# Patient Record
Sex: FEMALE | Race: BLACK OR AFRICAN AMERICAN | Employment: UNEMPLOYED
[De-identification: names, ages, dates, MRNs, and addresses within clinical notes are randomized per-mention and may not be internally consistent; named-entity substitution may affect disease eponyms.]

---

## 2017-02-28 ENCOUNTER — NURSE ONLY (OUTPATIENT)
Dept: OBGYN | Facility: CLINIC | Age: 27
End: 2017-02-28

## 2017-02-28 VITALS — RESPIRATION RATE: 14 BRPM | HEIGHT: 62 IN | WEIGHT: 184 LBS | BODY MASS INDEX: 33.86 KG/M2

## 2017-02-28 DIAGNOSIS — Z23 IMMUNIZATION DUE: Primary | ICD-10-CM

## 2017-02-28 PROCEDURE — 90471 IMMUNIZATION ADMIN: CPT | Performed by: OBSTETRICS & GYNECOLOGY

## 2017-02-28 PROCEDURE — 90651 9VHPV VACCINE 2/3 DOSE IM: CPT | Performed by: OBSTETRICS & GYNECOLOGY

## 2017-04-13 DIAGNOSIS — N94.6 DYSMENORRHEA: ICD-10-CM

## 2017-04-13 RX ORDER — IBUPROFEN 800 MG/1
800 TABLET ORAL EVERY 8 HOURS PRN
Qty: 60 TABLET | Refills: 0 | Status: SHIPPED | OUTPATIENT
Start: 2017-04-13 | End: 2017-05-05 | Stop reason: SDUPTHER

## 2017-04-18 ENCOUNTER — OFFICE VISIT (OUTPATIENT)
Dept: INTERNAL MEDICINE CLINIC | Age: 27
End: 2017-04-18
Payer: COMMERCIAL

## 2017-04-18 VITALS
HEIGHT: 62 IN | SYSTOLIC BLOOD PRESSURE: 110 MMHG | DIASTOLIC BLOOD PRESSURE: 68 MMHG | BODY MASS INDEX: 39.38 KG/M2 | WEIGHT: 214 LBS

## 2017-04-18 DIAGNOSIS — F32.89 OTHER DEPRESSION: Primary | ICD-10-CM

## 2017-04-18 DIAGNOSIS — F31.9 BIPOLAR 1 DISORDER (HCC): ICD-10-CM

## 2017-04-18 DIAGNOSIS — Z00.00 HEALTH CARE MAINTENANCE: ICD-10-CM

## 2017-04-18 PROCEDURE — 99213 OFFICE O/P EST LOW 20 MIN: CPT | Performed by: INTERNAL MEDICINE

## 2017-04-18 RX ORDER — CITALOPRAM 20 MG/1
20 TABLET ORAL DAILY
Qty: 30 TABLET | Refills: 6 | Status: SHIPPED | OUTPATIENT
Start: 2017-04-18 | End: 2021-03-09 | Stop reason: SDUPTHER

## 2017-05-05 DIAGNOSIS — N94.6 DYSMENORRHEA: ICD-10-CM

## 2017-05-05 RX ORDER — IBUPROFEN 800 MG/1
TABLET ORAL
Qty: 60 TABLET | Refills: 1 | Status: SHIPPED | OUTPATIENT
Start: 2017-05-05 | End: 2017-08-01 | Stop reason: SDUPTHER

## 2017-08-01 DIAGNOSIS — N94.6 DYSMENORRHEA: ICD-10-CM

## 2017-08-01 RX ORDER — IBUPROFEN 800 MG/1
800 TABLET ORAL EVERY 8 HOURS PRN
Qty: 60 TABLET | Refills: 1 | Status: SHIPPED | OUTPATIENT
Start: 2017-08-01 | End: 2017-09-26 | Stop reason: SDUPTHER

## 2017-09-26 DIAGNOSIS — N94.6 DYSMENORRHEA: ICD-10-CM

## 2017-09-27 RX ORDER — IBUPROFEN 800 MG/1
TABLET ORAL
Qty: 60 TABLET | Refills: 1 | Status: SHIPPED | OUTPATIENT
Start: 2017-09-27 | End: 2018-01-04 | Stop reason: SDUPTHER

## 2018-01-04 DIAGNOSIS — N94.6 DYSMENORRHEA: ICD-10-CM

## 2018-01-04 RX ORDER — IBUPROFEN 800 MG/1
TABLET ORAL
Qty: 60 TABLET | Refills: 3 | Status: SHIPPED | OUTPATIENT
Start: 2018-01-04 | End: 2018-05-21 | Stop reason: SDUPTHER

## 2018-05-21 DIAGNOSIS — N94.6 DYSMENORRHEA: ICD-10-CM

## 2018-05-23 RX ORDER — IBUPROFEN 800 MG/1
TABLET ORAL
Qty: 60 TABLET | Refills: 3 | Status: SHIPPED | OUTPATIENT
Start: 2018-05-23 | End: 2020-04-30 | Stop reason: SDUPTHER

## 2018-09-18 DIAGNOSIS — N94.6 DYSMENORRHEA: ICD-10-CM

## 2018-09-20 DIAGNOSIS — N94.6 DYSMENORRHEA: ICD-10-CM

## 2018-09-20 RX ORDER — IBUPROFEN 800 MG/1
800 TABLET ORAL EVERY 8 HOURS PRN
Qty: 60 TABLET | Refills: 3 | OUTPATIENT
Start: 2018-09-20

## 2018-09-20 NOTE — TELEPHONE ENCOUNTER
Medication: Ibuprofen  Last visit: 4/18/2017  Next visit: Visit date not found  Last refill: 5/23/2018  Pharmacy: [facility]

## 2018-09-24 ENCOUNTER — TELEPHONE (OUTPATIENT)
Dept: INTERNAL MEDICINE CLINIC | Age: 28
End: 2018-09-24

## 2018-09-24 DIAGNOSIS — N94.6 DYSMENORRHEA: ICD-10-CM

## 2018-09-24 RX ORDER — IBUPROFEN 800 MG/1
TABLET ORAL
Qty: 60 TABLET | Refills: 3 | OUTPATIENT
Start: 2018-09-24

## 2018-09-25 NOTE — TELEPHONE ENCOUNTER
Cannot give Ibuprofen that high dose for long term   Refer to Dr Codie Red to address her Menstrual cramps

## 2018-09-26 RX ORDER — IBUPROFEN 800 MG/1
800 TABLET ORAL EVERY 8 HOURS PRN
Qty: 21 TABLET | Refills: 0 | Status: SHIPPED | OUTPATIENT
Start: 2018-09-26 | End: 2021-03-09

## 2018-10-18 ENCOUNTER — HOSPITAL ENCOUNTER (OUTPATIENT)
Age: 28
Setting detail: SPECIMEN
Discharge: HOME | End: 2018-10-18
Payer: COMMERCIAL

## 2018-10-18 ENCOUNTER — OFFICE VISIT (OUTPATIENT)
Dept: OBGYN | Age: 28
End: 2018-10-18
Payer: COMMERCIAL

## 2018-10-18 VITALS
SYSTOLIC BLOOD PRESSURE: 136 MMHG | HEIGHT: 62 IN | WEIGHT: 276 LBS | DIASTOLIC BLOOD PRESSURE: 89 MMHG | BODY MASS INDEX: 50.79 KG/M2 | HEART RATE: 88 BPM

## 2018-10-18 DIAGNOSIS — Z11.3 SCREEN FOR STD (SEXUALLY TRANSMITTED DISEASE): ICD-10-CM

## 2018-10-18 DIAGNOSIS — Z00.00 PREVENTATIVE HEALTH CARE: ICD-10-CM

## 2018-10-18 DIAGNOSIS — Z01.419 WELL WOMAN EXAM WITH ROUTINE GYNECOLOGICAL EXAM: Primary | ICD-10-CM

## 2018-10-18 PROCEDURE — 99213 OFFICE O/P EST LOW 20 MIN: CPT | Performed by: OBSTETRICS & GYNECOLOGY

## 2018-10-18 PROCEDURE — 99395 PREV VISIT EST AGE 18-39: CPT | Performed by: OBSTETRICS & GYNECOLOGY

## 2018-10-18 PROCEDURE — G8484 FLU IMMUNIZE NO ADMIN: HCPCS | Performed by: OBSTETRICS & GYNECOLOGY

## 2018-10-18 RX ORDER — RISPERIDONE 1 MG/1
1 TABLET, FILM COATED ORAL DAILY
Refills: 0 | COMMUNITY
Start: 2018-08-23

## 2018-10-18 NOTE — PROGRESS NOTES
History: yes - according to pt - not found in our records on Epic  Colposcopy History: yes - according to pt  Date of Last Mammogram: n/a  Date of Last Colonoscopy: n/a  Date of Last Bone Density: n/a      ________________________________________________________________________        REVIEW OF SYSTEMS:       A minimum of an eleven point review of systems was completed. Review Of Systems (11 point):  Constitutional: No fever, chills or malaise; No weight change or fatigue  Head and Eyes: No vision, Headache, Dizziness or trauma in last 12 months  ENT ROS: No hearing, Tinnitis, sinus or taste problems  Hematological and Lymphatic ROS:No Lymphoma, Von Willebrand's, Hemophillia or Bleeding History  Psych ROS: No Depression, Homicidal thoughts,suicidal thoughts, or anxiety  Breast ROS: No prior breast abnormalities or lumps  Respiratory ROS: No SOB, Pneumoniae,Cough, or Pulmonary Embolism History  Cardiovascular ROS: No Chest Pain with Exertion, Palpitations, Syncope, Edema, Arrhythmia  Gastrointestinal ROS: No Indigestion, Heartburn, Nausea, vomiting, Diarrhea, Constipation,or Bowel Changes; No Bloody Stools or melena  Genito-Urinary ROS: No Dysuria, Hematuria or Nocturia. No Urinary Incontinence or Vaginal Discharge  Musculoskeletal ROS: No Arthralgia, Arthritis,Gout,Osteoporosis or Rheumatism  Neurological ROS: No CVA, Migraines, Epilepsy, Seizure Hx, or Limb Weakness  Dermatological ROS: No Rash, Itching, Hives, Mole Changes or Cancer                                                                                                                                                                                                                                  PHYSICAL Exam:     Constitutional:  Vitals:    10/18/18 1536   BP: 136/89   Pulse: 88   Weight: 276 lb (125.2 kg)   Height: 5' 2\" (1.575 m)         General Appearance: This  is a well Developed, well Nourished, well groomed female. Her BMI was reviewed.

## 2018-10-19 LAB
C TRACH DNA GENITAL QL NAA+PROBE: NEGATIVE
DIRECT EXAM: ABNORMAL
Lab: ABNORMAL
N. GONORRHOEAE DNA: NEGATIVE
SPECIMEN DESCRIPTION: ABNORMAL
STATUS: ABNORMAL

## 2018-10-22 RX ORDER — METRONIDAZOLE 500 MG/1
500 TABLET ORAL 2 TIMES DAILY
Qty: 14 TABLET | Refills: 0 | Status: SHIPPED | OUTPATIENT
Start: 2018-10-22 | End: 2018-10-29

## 2018-10-22 RX ORDER — FLUCONAZOLE 150 MG/1
150 TABLET ORAL ONCE
Qty: 2 TABLET | Refills: 0 | Status: SHIPPED | OUTPATIENT
Start: 2018-10-22 | End: 2018-10-22

## 2018-11-05 LAB — CYTOLOGY REPORT: NORMAL

## 2020-04-29 ENCOUNTER — TELEPHONE (OUTPATIENT)
Dept: ADMINISTRATIVE | Age: 30
End: 2020-04-29

## 2020-04-30 RX ORDER — IBUPROFEN 800 MG/1
TABLET ORAL
Qty: 60 TABLET | Refills: 3 | Status: SHIPPED | OUTPATIENT
Start: 2020-04-30

## 2020-04-30 NOTE — TELEPHONE ENCOUNTER
Pt is calling stating she needs a refill on her Motrin.  Please call pt with any questions at phone number [phone]

## 2020-07-29 ENCOUNTER — TELEPHONE (OUTPATIENT)
Dept: OBGYN | Age: 30
End: 2020-07-29

## 2020-07-31 ENCOUNTER — TELEPHONE (OUTPATIENT)
Dept: ADMINISTRATIVE | Age: 30
End: 2020-07-31

## 2020-07-31 NOTE — TELEPHONE ENCOUNTER
Pt is calling wanting to get an update on her medication.  Please call pt at phone number [phone]

## 2020-08-12 ENCOUNTER — TELEPHONE (OUTPATIENT)
Dept: INTERNAL MEDICINE CLINIC | Age: 30
End: 2020-08-12

## 2020-11-10 ENCOUNTER — TELEPHONE (OUTPATIENT)
Dept: OBGYN | Age: 30
End: 2020-11-10

## 2020-11-10 NOTE — TELEPHONE ENCOUNTER
Called patient and left voice message for patient to give office a call to reschedule no show appointment 11/10/20.

## 2021-02-24 ENCOUNTER — HOSPITAL ENCOUNTER (OUTPATIENT)
Age: 31
Setting detail: SPECIMEN
Discharge: HOME | End: 2021-02-24
Payer: COMMERCIAL

## 2021-02-24 ENCOUNTER — OFFICE VISIT (OUTPATIENT)
Dept: OBGYN | Age: 31
End: 2021-02-24
Payer: COMMERCIAL

## 2021-02-24 VITALS
DIASTOLIC BLOOD PRESSURE: 86 MMHG | SYSTOLIC BLOOD PRESSURE: 137 MMHG | BODY MASS INDEX: 55.16 KG/M2 | TEMPERATURE: 97 F | HEART RATE: 78 BPM | WEIGHT: 293 LBS

## 2021-02-24 DIAGNOSIS — Z01.419 WOMEN'S ANNUAL ROUTINE GYNECOLOGICAL EXAMINATION: Primary | ICD-10-CM

## 2021-02-24 PROCEDURE — 90686 IIV4 VACC NO PRSV 0.5 ML IM: CPT | Performed by: OBSTETRICS & GYNECOLOGY

## 2021-02-24 PROCEDURE — 4004F PT TOBACCO SCREEN RCVD TLK: CPT | Performed by: STUDENT IN AN ORGANIZED HEALTH CARE EDUCATION/TRAINING PROGRAM

## 2021-02-24 PROCEDURE — G8482 FLU IMMUNIZE ORDER/ADMIN: HCPCS | Performed by: STUDENT IN AN ORGANIZED HEALTH CARE EDUCATION/TRAINING PROGRAM

## 2021-02-24 PROCEDURE — G8417 CALC BMI ABV UP PARAM F/U: HCPCS | Performed by: STUDENT IN AN ORGANIZED HEALTH CARE EDUCATION/TRAINING PROGRAM

## 2021-02-24 PROCEDURE — G8427 DOCREV CUR MEDS BY ELIG CLIN: HCPCS | Performed by: STUDENT IN AN ORGANIZED HEALTH CARE EDUCATION/TRAINING PROGRAM

## 2021-02-24 PROCEDURE — 99395 PREV VISIT EST AGE 18-39: CPT | Performed by: STUDENT IN AN ORGANIZED HEALTH CARE EDUCATION/TRAINING PROGRAM

## 2021-02-24 NOTE — PROGRESS NOTES
Vaccine Information Sheet, \"Influenza - Inactivated\"  given to Cheryl Villalba, or parent/legal guardian of  Cheryl Villalba and verbalized understanding. Patient responses:    Have you ever had a reaction to a flu vaccine? No  Are you able to eat eggs without adverse effects? Yes  Do you have any current illness? No  Have you ever had Guillian Emerson Syndrome? No    Flu vaccine given per order. Please see immunization tab.

## 2021-02-24 NOTE — PROGRESS NOTES
[facility] OB/GYN Annual Visit    Elia Boland  2021                       Primary Care Physician: Bridget Nascimento MD    CC:   Chief Complaint   Patient presents with    Annual Exam         HPI: Elia Boland is a 31 y.o. female     The patient was seen and examined. She is here for an annual visit. She has no complaints today. Her LMP was 2 months ago but this is normal for her. She denies irregular/heavy bleeding and dysmenorrhea. Her periods are regular and last 5-7 days. She describes them as moderate. Her bowel habits are regular. She denies any bloating. She denies dysuria. She denies urinary leaking. She denies abnormal vaginal discharge. She is not sexually active currently but has a history of 1 lifetime male partner. She uses condoms for contraception and is not desiring pregnancy.     Depression Screen: Symptoms of decreased mood absent  Symptoms of anhedonia absent      REVIEW OF SYSTEMS:   An 11 point review of systems was completed    Constitutional: negative fever, negative chills  HEENT: negative visual disturbances, negative headaches  Respiratory: negative dyspnea, negative cough  Cardiovascular: negative chest pain,  negative palpitations  Gastrointestinal: negative abdominal pain, negative RUQ pain, negative N/V, negative diarrhea, negative constipation  Genitourinary: negative dysuria, negative vaginal discharge  Dermatological: negative rash  Hematologic: negative bruising  Immunologic/Lymphatic: negative recent illness, negative recent sick contact  Musculoskeletal: negative back pain, negative myalgias, negative arthralgias  Neurological:  negative dizziness, negative weakness  Behavior/Psych: negative depression, negative anxiety  ________________________________________________________________________    GYNECOLOGICAL HISTORY:  Age of Menarche: 8  Age of Menopause: N/A   Menstrual cycles occur every 4-8 weeks, last 5-7 days, and are moderate Sexually Active: has sex with males, 1 lifetime partner  STD History: positive trichomonas 16    Pap History:  16: NILM  10/18/18: NILM  Colposcopy History: denies    Permanent Sterilization: no  Reversible Birth Control: no  Hormone Replacement Exposure: no    OBSTETRICAL HISTORY:  OB History    Para Term  AB Living   1 1 1 0 0 1   SAB TAB Ectopic Molar Multiple Live Births   0 0 0 0 0 1      # Outcome Date GA Lbr Aram/2nd Weight Sex Delivery Anes PTL Lv   1 Term 2010 [id number]   M CS-LTranv  N GABRIELLA      Birth Comments: failure to descend, ascynclitic       PAST MEDICAL HISTORY:   has a past medical history of Bipolar disorder ([facility] [address] Depression, and Obesity. PAST SURGICAL HISTORY:   has a past surgical history that includes Tonsillectomy and  section. ALLERGIES:  has No Known Allergies. MEDICATIONS:  Prior to Admission medications    Medication Sig Start Date End Date Taking? Authorizing Provider   ondansetron (ZOFRAN-ODT) 4 MG disintegrating tablet Take 4 mg by mouth 18  Yes Historical Provider, MD   ibuprofen (ADVIL;MOTRIN) 800 MG tablet take 1 tablet by mouth every 8 hours if needed for pain 20  Yes Piper Gómez MD   risperiDONE (RISPERDAL) 1 MG tablet Take 1 tablet by mouth daily 18  Yes Historical Provider, MD   ibuprofen (IBU) 800 MG tablet Take 1 tablet by mouth every 8 hours as needed for Pain Take with food 18  Yes Piper Gómez MD   citalopram (CELEXA) 20 MG tablet Take 1 tablet by mouth daily 17  Yes Omari Wynne MD       FAMILY HISTORY:  Family History of Breast, Ovarian, Colon or Uterine Cancer: No   family history includes Cancer in her maternal grandmother. (unknown type)    SOCIAL HISTORY:   reports that she has been smoking cigarettes. She has a 0.10 pack-year smoking history. She has never used smokeless tobacco. She reports that she does not drink alcohol or use drugs.     HEALTH MAINTENANCE: Immunization status: up to date and documented  Gardasil immunization: done    Date of Last Mammogram: patient has never had a mammogram  Date of Last Colonoscopy: patient has never had a colonoscopy  Date of Last Bone Density: patient has never had a bone density scan     VITALS:  Vitals:    02/24/21 1529   BP: 137/86   Site: Right Upper Arm   Position: Sitting   Cuff Size: Large Adult   Pulse: 78   Temp: 97 °F (36.1 °C)   Weight: (!) 301 lb 9.6 oz (136.8 kg)                                                                                                                                                                           PHYSICAL EXAM:   General Appearance: Appears healthy. Alert; in no acute distress. Pleasant. Skin: Skin color, texture, turgor normal. No rashes or lesions. Lymphatic: No abnormally enlarged lymph nodes. HEENT: normocephalic and atraumatic, Thyroid normal to inspection  Respiratory: Normal expansion. Clear to auscultation. No rales, rhonchi, or wheezing.   Cardiovascular: normal rate, regular rhythm, normal S1 and S2, no murmurs, no gallops and intact distal pulses  Breast:  (Chest): normal appearance, no masses or tenderness, No nipple retraction or dimpling, No nipple discharge or bleeding, No axillary or supraclavicular adenopathy, positive acanthosis nigricans in the inferior breast fold bilaterally  Abdomen: obese, soft, non-tender and non-distended, bowel sounds present  Pelvic Exam:   Chaperone for Intimate Exam: Chaperone was present for entire exam, Chaperone Name: Valarie Schwarz RN  External genitalia: General appearance; normal, Hair distribution; normal, Lesions absent  Urinary system: urethral meatus normal  Vaginal: normal mucosa, no lesions, minimal white discharge  Cervix: normal appearing cervix without discharge or lesions  Adnexa: normal adnexa in size, nontender and no masses  Uterus: normal single, nontender  Rectal Exam: exam declined by patient Musculoskeletal: no gross abnormalities  Extremities: non-tender BLE and non-edematous  Psych:  oriented to time, place and person, mood and affect are within normal limits         ASSESSMENT & PLAN:    Imelda Awad is a 31 y.o. female  here for her Well Woman Exam    - Last pap smear  NILM, pap smear collected today   - GC/C, vaginitis collected   - She previously received the Gardasil immunization   - Denies hx of dysmenorrhea or abnormal bleeding    - Not currently sexually active   - Influenza vaccination ordered   - RTC in 1 year for annual exam    Patient Active Problem List    Diagnosis Date Noted    Obesity 2011     Priority: High    H/O trichomonal vaginitis 2015     Treated in 2014      H/O gonorrhea 2015     Treated in 2013, neg JOSE G to follow      Depression 07/15/2013     Patient denies, no longer taking abilify and celexa (stopped in )      Bipolar 1 disorder ([facility] [address] 07/15/2013     Patient denies, no longer taking abilify and celexa (stopped in )         Return in about 1 year (around 2022) for Annual Exam.  No Patient Care Coordination Note on file. Counseling Completed:    discussed need for repeat pap as per American Society for Colposcopy and Cervical Pathology guidelines. discussed birth control and barrier recommendations. discussed STD counseling and prevention. Hereditary Breast, Ovarian, Colon and Uterine Cancer screening discussed. Tobacco & Secondary smoke risks discussed; with recommendation for cessation and avoidance. Routine health maintenance per patients PCP discussed. Patient was seen with total face to face time of 20 minutes. More than 50% of this visit was on counseling and education regarding the problems listed below and her options. She was also counseled on her preventative health maintenance recommendations and follow-up.      Diagnosis Orders

## 2021-02-25 DIAGNOSIS — Z01.419 WOMEN'S ANNUAL ROUTINE GYNECOLOGICAL EXAMINATION: ICD-10-CM

## 2021-02-25 LAB
DIRECT EXAM: ABNORMAL
Lab: ABNORMAL
SPECIMEN DESCRIPTION: ABNORMAL

## 2021-02-26 LAB
C TRACH DNA GENITAL QL NAA+PROBE: NEGATIVE
N. GONORRHOEAE DNA: NEGATIVE
SPECIMEN DESCRIPTION: NORMAL

## 2021-03-01 ENCOUNTER — TELEPHONE (OUTPATIENT)
Dept: OBGYN | Age: 31
End: 2021-03-01

## 2021-03-01 DIAGNOSIS — A59.9 TRICHOMONAS VAGINALIS INFECTION: Primary | ICD-10-CM

## 2021-03-01 RX ORDER — METRONIDAZOLE 500 MG/1
500 TABLET ORAL 2 TIMES DAILY
Qty: 14 TABLET | Refills: 0 | Status: SHIPPED | OUTPATIENT
Start: 2021-03-01 | End: 2021-03-09

## 2021-03-01 NOTE — TELEPHONE ENCOUNTER
Called patient to informed her of positive Trichomonas vaginalis and BV on vaginitis. Will send Rx Flagyl 500mg BID x7 days to [facility] on [address]. Instructed patient to inform all partners so that they can be treated and instructed her to abstain from sexual activity until she is fully treated. All questions answered.        Wilmer Alfred DO   [facility]   [facility], [address] [place] [address] [address] [phone]

## 2021-03-05 LAB — CYTOLOGY REPORT: NORMAL

## 2021-03-08 RX ORDER — PALIPERIDONE PALMITATE 117 MG/.75ML
INJECTION INTRAMUSCULAR
COMMUNITY
Start: 2021-03-03

## 2021-03-09 ENCOUNTER — OFFICE VISIT (OUTPATIENT)
Dept: INTERNAL MEDICINE CLINIC | Age: 31
End: 2021-03-09
Payer: COMMERCIAL

## 2021-03-09 VITALS
TEMPERATURE: 98.4 F | HEIGHT: 62 IN | BODY MASS INDEX: 53.92 KG/M2 | SYSTOLIC BLOOD PRESSURE: 100 MMHG | WEIGHT: 293 LBS | DIASTOLIC BLOOD PRESSURE: 72 MMHG

## 2021-03-09 DIAGNOSIS — Z23 NEED FOR 23-POLYVALENT PNEUMOCOCCAL POLYSACCHARIDE VACCINE: ICD-10-CM

## 2021-03-09 DIAGNOSIS — E11.9 TYPE 2 DIABETES MELLITUS WITHOUT COMPLICATION, WITHOUT LONG-TERM CURRENT USE OF INSULIN (HCC): ICD-10-CM

## 2021-03-09 DIAGNOSIS — E66.01 MORBID OBESITY WITH BODY MASS INDEX (BMI) OF 50.0 TO 59.9 IN ADULT (HCC): ICD-10-CM

## 2021-03-09 DIAGNOSIS — F31.9 BIPOLAR I DISORDER (HCC): ICD-10-CM

## 2021-03-09 DIAGNOSIS — Z76.89 ENCOUNTER TO ESTABLISH CARE: Primary | ICD-10-CM

## 2021-03-09 DIAGNOSIS — F32.89 OTHER DEPRESSION: ICD-10-CM

## 2021-03-09 PROCEDURE — 3046F HEMOGLOBIN A1C LEVEL >9.0%: CPT | Performed by: NURSE PRACTITIONER

## 2021-03-09 PROCEDURE — 99203 OFFICE O/P NEW LOW 30 MIN: CPT | Performed by: NURSE PRACTITIONER

## 2021-03-09 PROCEDURE — 90732 PPSV23 VACC 2 YRS+ SUBQ/IM: CPT | Performed by: NURSE PRACTITIONER

## 2021-03-09 PROCEDURE — G8482 FLU IMMUNIZE ORDER/ADMIN: HCPCS | Performed by: NURSE PRACTITIONER

## 2021-03-09 PROCEDURE — 4004F PT TOBACCO SCREEN RCVD TLK: CPT | Performed by: NURSE PRACTITIONER

## 2021-03-09 PROCEDURE — G8427 DOCREV CUR MEDS BY ELIG CLIN: HCPCS | Performed by: NURSE PRACTITIONER

## 2021-03-09 PROCEDURE — G8417 CALC BMI ABV UP PARAM F/U: HCPCS | Performed by: NURSE PRACTITIONER

## 2021-03-09 PROCEDURE — 2022F DILAT RTA XM EVC RTNOPTHY: CPT | Performed by: NURSE PRACTITIONER

## 2021-03-09 RX ORDER — CITALOPRAM 20 MG/1
20 TABLET ORAL DAILY
Qty: 30 TABLET | Refills: 3 | Status: SHIPPED | OUTPATIENT
Start: 2021-03-09

## 2021-03-09 RX ORDER — CITALOPRAM 20 MG/1
20 TABLET ORAL DAILY
Qty: 30 TABLET | Refills: 3 | Status: SHIPPED | OUTPATIENT
Start: 2021-03-09 | End: 2021-03-09 | Stop reason: SDUPTHER

## 2021-03-09 ASSESSMENT — ENCOUNTER SYMPTOMS
WHEEZING: 0
BACK PAIN: 0
COUGH: 0
CONSTIPATION: 0
ABDOMINAL PAIN: 0

## 2021-03-09 ASSESSMENT — PATIENT HEALTH QUESTIONNAIRE - PHQ9
2. FEELING DOWN, DEPRESSED OR HOPELESS: 0
1. LITTLE INTEREST OR PLEASURE IN DOING THINGS: 0
SUM OF ALL RESPONSES TO PHQ QUESTIONS 1-9: 0
SUM OF ALL RESPONSES TO PHQ QUESTIONS 1-9: 0

## 2021-03-09 NOTE — PROGRESS NOTES
Visit Information    Have you changed or started any medications since your last visit including any over-the-counter medicines, vitamins, or herbal medicines? no   Are you having any side effects from any of your medications? -  no  Have you stopped taking any of your medications? Is so, why? -  no    Have you seen any other physician or provider since your last visit? No  Have you had any other diagnostic tests since your last visit? No  Have you been seen in the emergency room and/or had an admission to a hospital since we last saw you? No  Have you had your routine dental cleaning in the past 6 months? no    Have you activated your [employer] account? If not, what are your barriers?  Yes     Patient Care Team:  Opal Vaughan MD as PCP - General (Internal Medicine)  Opal Vaughan MD as PCP - [facility]    Medical History Review  Past Medical, Family, and Social History reviewed and does contribute to the patient presenting condition    Health Maintenance   Topic Date Due    Varicella vaccine (1 of 2 - 2-dose childhood series) Never done    DTaP/Tdap/Td vaccine (2 - Tdap) 12/13/2011    Cervical cancer screen  02/24/2024    Flu vaccine  Completed    Pneumococcal 0-64 years Vaccine  Completed    Hepatitis C screen  Completed    HIV screen  Completed    Hepatitis A vaccine  Aged Out    Hepatitis B vaccine  Aged Out    Hib vaccine  Aged Out    Meningococcal (ACWY) vaccine  Aged Out         [id number] Patient Note/History and Physical    Date of patient's visit: 3/9/2021     Name:  Kita Candelario      YOB: 1990    Patient Care Team:  Opal Vaughan MD as PCP - General (Internal Medicine)  Opal Vaughan MD as PCP - [facility]    REASON FOR VISIT: First Visit, establish care   Chief Complaint   Patient presents with    New Patient    Weight Loss     discuss weight loss medications        HISTORY OF PRESENTING ILLNESS:    History was obtained from the patient. Nelson Britt is a 31 y.o. is here to establish care. She states she has not been under the care of a primary care provider for a long time. She has been out of her meds for bipolar for a few weeks, goes to [facility] for behavioral health. She states she was recently told she was diabetic, is on metformin, but no A1c available in [facility] or Care Everywhere. PAST MEDICAL AND SURGICAL HISTORY:          Diagnosis Date    Bipolar disorder ([facility] [address]     Depression 07/15/2013    Obesity     Type 2 diabetes mellitus without complication, without long-term current use of insulin ([facility] [address]            Procedure Laterality Date     SECTION      2011    TONSILLECTOMY      at age 10       SOCIAL HISTORY:    TOBACCO:   reports that she has been smoking cigarettes. She has a 0.10 pack-year smoking history. She has never used smokeless tobacco.  ETOH:   reports no history of alcohol use. DRUGS:  reports no history of drug use. OCCUPATION:      ALLERGIES:    No Known Allergies      HOME MEDICATION:      Current Outpatient Medications on File Prior to Visit   Medication Sig Dispense Refill    INVEGA SUSTENNA 117 MG/0.75ML SONAM IM injection       ondansetron (ZOFRAN-ODT) 4 MG disintegrating tablet Take 4 mg by mouth      ibuprofen (ADVIL;MOTRIN) 800 MG tablet take 1 tablet by mouth every 8 hours if needed for pain 60 tablet 3    risperiDONE (RISPERDAL) 1 MG tablet Take 1 tablet by mouth daily  0     No current facility-administered medications on file prior to visit. FAMILY HISTORY:          Problem Relation Age of Onset    Cancer Maternal Grandmother         skin cancer    Breast Cancer Neg Hx     Colon Cancer Neg Hx     Diabetes Neg Hx     Eclampsia Neg Hx     Hypertension Neg Hx     Ovarian Cancer Neg Hx      Labor Neg Hx     Spont Abortions Neg Hx     Stroke Neg Hx        REVIEW OF SYSTEMS:    Review of Systems   Constitutional: Negative for chills, fatigue and fever.    HENT: Negative for congestion, ear pain, hearing loss, rhinorrhea and trouble swallowing. Eyes: Negative for visual disturbance. Respiratory: Positive for shortness of breath (occasional with exertion). Negative for cough and wheezing. Cardiovascular: Negative for chest pain, palpitations and leg swelling. Gastrointestinal: Negative for abdominal pain and constipation. Genitourinary: Negative for difficulty urinating and hematuria. Musculoskeletal: Negative for arthralgias and back pain. Neurological: Negative for dizziness, numbness and headaches. Psychiatric/Behavioral: Negative for sleep disturbance. The patient is nervous/anxious. Goes to [employer]       PHYSICAL EXAM:      Vitals:    03/09/21 1406   BP: 100/72   Temp: 98.4 °F (36.9 °C)   Weight: 297 lb 6.4 oz (134.9 kg)   Height: 5' 2\" (1.575 m)       Physical Exam  Constitutional:       General: She is not in acute distress. Appearance: Normal appearance. She is well-developed. She is obese. HENT:      Head: Normocephalic and atraumatic. Right Ear: Tympanic membrane and external ear normal.      Left Ear: Tympanic membrane and external ear normal.      Nose: Nose normal.      Mouth/Throat:      Mouth: Mucous membranes are moist.      Pharynx: Oropharynx is clear. Eyes:      General:         Right eye: No discharge. Left eye: No discharge. Conjunctiva/sclera: Conjunctivae normal.      Pupils: Pupils are equal, round, and reactive to light. Neck:      Musculoskeletal: Normal range of motion and neck supple. Thyroid: No thyromegaly. Cardiovascular:      Rate and Rhythm: Normal rate and regular rhythm. Pulses: Normal pulses. Heart sounds: Normal heart sounds. No murmur. Pulmonary:      Effort: Pulmonary effort is normal.      Breath sounds: Normal breath sounds. No wheezing. Abdominal:      General: Bowel sounds are normal. There is no distension. Palpations: Abdomen is soft.       Tenderness: There is no abdominal tenderness. Musculoskeletal:      Cervical back: She exhibits normal range of motion, no tenderness and no deformity. Thoracic back: She exhibits no tenderness and no deformity. Lumbar back: She exhibits normal range of motion, no tenderness and no deformity. Lymphadenopathy:      Cervical: No cervical adenopathy. Skin:     General: Skin is warm and dry. Findings: No rash. Neurological:      Mental Status: She is alert and oriented to person, place, and time. Gait: Gait normal.   Psychiatric:         Mood and Affect: Mood normal.         Speech: Speech normal.         Behavior: Behavior normal. Behavior is cooperative. LABORATORYFINDINGS:    CBC:   Lab Results   Component Value Date    WBC 4.9 06/16/2013    HGB 13.6 06/16/2013     06/16/2013     05/10/2012     BMP:   Lab Results   Component Value Date     10/24/2012    K 3.8 10/24/2012     10/24/2012    CO2 23 10/24/2012    BUN 15 10/24/2012    CREATININE 0.68 10/24/2012    GLUCOSE 94 10/24/2012    GLUCOSE 51 05/10/2012     Hemoglobin A1C: No results found for: LABA1C  Lipid profile: No results found for: CHOL, TRIG, HDL  Thyroid functions: No results found for: TSH   Hepatic functions:   Lab Results   Component Value Date    ALT 16 06/16/2013    AST 14 06/16/2013    PROT 7.8 06/16/2013    BILITOT 0.67 06/16/2013    BILIDIR 0.26 06/16/2013    LABALBU 4.2 06/16/2013       ASSESSMENT AND PLAN:     Visit Diagnoses and Associated Orders     Encounter to establish care    -  Primary    CBC [[id number] Custom]   - Future Order    Comprehensive Metabolic Panel [[id number] Custom]   - Future Order    Varicella Zoster Antibody, IgG [[id number] Custom]   - Future Order         [facility]        Management per psych, patient encouraged to make follow-up appointment with [facility].   Will refill Celexa    INVEGA SUSTENNA 117 MG/0.75ML SONAM IM injection [[id number]]           Other depression        Mood

## 2021-03-10 ASSESSMENT — ENCOUNTER SYMPTOMS
RHINORRHEA: 0
TROUBLE SWALLOWING: 0
SHORTNESS OF BREATH: 1

## 2021-05-14 ENCOUNTER — TELEPHONE (OUTPATIENT)
Dept: INTERNAL MEDICINE CLINIC | Age: 31
End: 2021-05-14

## 2021-05-14 NOTE — TELEPHONE ENCOUNTER
Called patient. Somebody  (unidentified)  Answered phone, and stated that Jaswindrerjkathleen Barrettjalen was not there,  They would have her call back. Calling to remind pt of blood work ordered.

## 2022-02-24 ENCOUNTER — HOSPITAL ENCOUNTER (EMERGENCY)
Age: 32
Discharge: LEFT BEFORE DISCHARGE | End: 2022-02-24

## 2022-02-25 ENCOUNTER — HOSPITAL ENCOUNTER (EMERGENCY)
Age: 32
Discharge: HOME | End: 2022-02-25
Attending: EMERGENCY MEDICINE
Payer: COMMERCIAL

## 2022-02-25 ENCOUNTER — APPOINTMENT (OUTPATIENT)
Dept: GENERAL RADIOLOGY | Age: 32
End: 2022-02-25
Payer: COMMERCIAL

## 2022-02-25 ENCOUNTER — APPOINTMENT (OUTPATIENT)
Dept: CT IMAGING | Age: 32
End: 2022-02-25
Payer: COMMERCIAL

## 2022-02-25 VITALS
HEART RATE: 109 BPM | TEMPERATURE: 98.4 F | RESPIRATION RATE: 18 BRPM | OXYGEN SATURATION: 94 % | DIASTOLIC BLOOD PRESSURE: 87 MMHG | SYSTOLIC BLOOD PRESSURE: 157 MMHG

## 2022-02-25 DIAGNOSIS — R41.0 EPISODE OF CONFUSION: Primary | ICD-10-CM

## 2022-02-25 LAB
ABSOLUTE EOS #: <0.03 K/UL (ref 0–0.44)
ABSOLUTE IMMATURE GRANULOCYTE: 0.03 K/UL (ref 0–0.3)
ABSOLUTE LYMPH #: 2.02 K/UL (ref 1.1–3.7)
ABSOLUTE MONO #: 0.8 K/UL (ref 0.1–1.2)
ACETAMINOPHEN LEVEL: <5 UG/ML (ref 10–30)
ALBUMIN SERPL-MCNC: 4.7 G/DL (ref 3.5–5.2)
ALBUMIN/GLOBULIN RATIO: 1.3 (ref 1–2.5)
ALP BLD-CCNC: 66 U/L (ref 35–104)
ALT SERPL-CCNC: 22 U/L (ref 5–33)
ANION GAP SERPL CALCULATED.3IONS-SCNC: 15 MMOL/L (ref 9–17)
AST SERPL-CCNC: 20 U/L
BASOPHILS # BLD: 0 % (ref 0–2)
BASOPHILS ABSOLUTE: <0.03 K/UL (ref 0–0.2)
BILIRUB SERPL-MCNC: 0.62 MG/DL (ref 0.3–1.2)
BILIRUBIN DIRECT: 0.11 MG/DL
BILIRUBIN, INDIRECT: 0.51 MG/DL (ref 0–1)
BUN BLDV-MCNC: 9 MG/DL (ref 6–20)
CALCIUM SERPL-MCNC: 9.8 MG/DL (ref 8.6–10.4)
CARBOXYHEMOGLOBIN: 2.3 % (ref 0–5)
CHLORIDE BLD-SCNC: 104 MMOL/L (ref 98–107)
CHP ED QC CHECK: NORMAL
CO2: 21 MMOL/L (ref 20–31)
CREAT SERPL-MCNC: 0.79 MG/DL (ref 0.5–0.9)
EOSINOPHILS RELATIVE PERCENT: 0 % (ref 1–4)
ETHANOL PERCENT: <0.01 %
ETHANOL: <10 MG/DL
FIO2: ABNORMAL
GFR AFRICAN AMERICAN: >60 ML/MIN
GFR NON-AFRICAN AMERICAN: >60 ML/MIN
GFR SERPL CREATININE-BSD FRML MDRD: ABNORMAL ML/MIN/{1.73_M2}
GLUCOSE BLD-MCNC: 211 MG/DL
GLUCOSE BLD-MCNC: 211 MG/DL (ref 65–105)
GLUCOSE BLD-MCNC: 225 MG/DL (ref 70–99)
HCG QUALITATIVE: NEGATIVE
HCO3 VENOUS: 21.8 MMOL/L (ref 24–30)
HCT VFR BLD CALC: 41.2 % (ref 36.3–47.1)
HEMOGLOBIN: 13 G/DL (ref 11.9–15.1)
IMMATURE GRANULOCYTES: 0 %
LYMPHOCYTES # BLD: 20 % (ref 24–43)
MCH RBC QN AUTO: 28.4 PG (ref 25.2–33.5)
MCHC RBC AUTO-ENTMCNC: 31.6 G/DL (ref 28.4–34.8)
MCV RBC AUTO: 90.2 FL (ref 82.6–102.9)
MONOCYTES # BLD: 8 % (ref 3–12)
NEGATIVE BASE EXCESS, VEN: 2.5 MMOL/L (ref 0–2)
NRBC AUTOMATED: 0 PER 100 WBC
O2 SAT, VEN: 98.3 % (ref 60–85)
PATIENT TEMP: 37
PCO2, VEN: 38.3 (ref 39–55)
PDW BLD-RTO: 13.2 % (ref 11.8–14.4)
PH VENOUS: 7.37 (ref 7.32–7.42)
PLATELET # BLD: 258 K/UL (ref 138–453)
PMV BLD AUTO: 11.3 FL (ref 8.1–13.5)
PO2, VEN: 104 (ref 30–50)
POTASSIUM SERPL-SCNC: 3.6 MMOL/L (ref 3.7–5.3)
RBC # BLD: 4.57 M/UL (ref 3.95–5.11)
SALICYLATE LEVEL: <1 MG/DL (ref 3–10)
SEG NEUTROPHILS: 72 % (ref 36–65)
SEGMENTED NEUTROPHILS ABSOLUTE COUNT: 7.32 K/UL (ref 1.5–8.1)
SERUM OSMOLALITY: 316 MOSM/KG (ref 275–295)
SODIUM BLD-SCNC: 140 MMOL/L (ref 135–144)
TOTAL PROTEIN: 8.4 G/DL (ref 6.4–8.3)
TOXIC TRICYCLIC SC,BLOOD: NEGATIVE
TSH SERPL DL<=0.05 MIU/L-ACNC: 1.08 MIU/L (ref 0.3–5)
WBC # BLD: 10.2 K/UL (ref 3.5–11.3)

## 2022-02-25 PROCEDURE — G0480 DRUG TEST DEF 1-7 CLASSES: HCPCS

## 2022-02-25 PROCEDURE — 84703 CHORIONIC GONADOTROPIN ASSAY: CPT

## 2022-02-25 PROCEDURE — 71045 X-RAY EXAM CHEST 1 VIEW: CPT

## 2022-02-25 PROCEDURE — 85025 COMPLETE CBC W/AUTO DIFF WBC: CPT

## 2022-02-25 PROCEDURE — 82805 BLOOD GASES W/O2 SATURATION: CPT

## 2022-02-25 PROCEDURE — 36415 COLL VENOUS BLD VENIPUNCTURE: CPT

## 2022-02-25 PROCEDURE — 82947 ASSAY GLUCOSE BLOOD QUANT: CPT

## 2022-02-25 PROCEDURE — 80048 BASIC METABOLIC PNL TOTAL CA: CPT

## 2022-02-25 PROCEDURE — 84443 ASSAY THYROID STIM HORMONE: CPT

## 2022-02-25 PROCEDURE — 6360000002 HC RX W HCPCS: Performed by: STUDENT IN AN ORGANIZED HEALTH CARE EDUCATION/TRAINING PROGRAM

## 2022-02-25 PROCEDURE — 93005 ELECTROCARDIOGRAM TRACING: CPT | Performed by: STUDENT IN AN ORGANIZED HEALTH CARE EDUCATION/TRAINING PROGRAM

## 2022-02-25 PROCEDURE — 80179 DRUG ASSAY SALICYLATE: CPT

## 2022-02-25 PROCEDURE — 80076 HEPATIC FUNCTION PANEL: CPT

## 2022-02-25 PROCEDURE — 70450 CT HEAD/BRAIN W/O DYE: CPT

## 2022-02-25 PROCEDURE — 96374 THER/PROPH/DIAG INJ IV PUSH: CPT

## 2022-02-25 PROCEDURE — 80307 DRUG TEST PRSMV CHEM ANLYZR: CPT

## 2022-02-25 PROCEDURE — 99285 EMERGENCY DEPT VISIT HI MDM: CPT

## 2022-02-25 PROCEDURE — 83930 ASSAY OF BLOOD OSMOLALITY: CPT

## 2022-02-25 PROCEDURE — 80143 DRUG ASSAY ACETAMINOPHEN: CPT

## 2022-02-25 RX ORDER — LORAZEPAM 2 MG/ML
1 INJECTION INTRAMUSCULAR ONCE
Status: COMPLETED | OUTPATIENT
Start: 2022-02-25 | End: 2022-02-25

## 2022-02-25 RX ADMIN — LORAZEPAM 1 MG: 2 INJECTION INTRAMUSCULAR; INTRAVENOUS at 09:30

## 2022-02-25 ASSESSMENT — ENCOUNTER SYMPTOMS: ABDOMINAL PAIN: 0
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
## 2022-02-25 NOTE — ED PROVIDER NOTES
[facility]     Emergency Department     Faculty Attestation    I performed a history and physical examination of the patient and discussed management with the resident. I reviewed the residents note and agree with the documented findings including all diagnostic interpretations and plan of care. Any areas of disagreement are noted on the chart. I was personally present for the key portions of any procedures. I have documented in the chart those procedures where I was not present during the key portions. I have reviewed the emergency nurses triage note. I agree with the chief complaint, past medical history, past surgical history, allergies, medications, social and family history as documented unless otherwise noted below. Documentation of the HPI, Physical Exam and Medical Decision Making performed by scribes is based on my personal performance of the HPI, PE and MDM. For Physician Assistant/ Nurse Practitioner cases/documentation I have personally evaluated this patient and have completed at least one if not all key elements of the E/M (history, physical exam, and MDM). Additional findings are as noted. This patient was evaluated in the Emergency Department for symptoms described in the history of present illness. He/she was evaluated in the context of the global COVID-19 pandemic, which necessitated consideration that the patient might be at risk for infection with the SARS-CoV-2 virus that causes COVID-19. Institutional protocols and algorithms that pertain to the evaluation of patients at risk for COVID-19 are in a state of rapid change based on information released by regulatory bodies including the CDC and federal and state organizations. These policies and algorithms were followed during the patient's care in the ED. Primary Care Physician: Erich Iglesias MD    History:  This is a 36 y.o. female who presents to the Emergency Department with complaint of altered mental status. Found to have a bus stop confused. Limited history from patient. She friends when house hearing the sound. Denies recreational drug use. She reports that she cannot see although the exact nature of her decreased vision fluctuates on repeat questioning. She reports that it is in both eyes and that she can see shapes but has trouble identifying numbers of fingers    Physical:     vitals were not taken for this visit. 36 y.o. female no acute distress, fluctuating orientation with several episodes of staring off and not following commands. Some shivering versus twitching in the lower extremities. Borderline tachycardic regular rhythm, pulmonary clear bilaterally skin cool but dry. Pupils equal and reactive bilaterally. Intermittently will track with eyes and when she does so extraocular motion is intact. Moving all 4 extremities equally. Sensation appears intact in all 4 extremities.     Impression: Altered mentation, suspect toxicologic etiology    Plan: Labs, CT head, EKG, milligram Ativan, reassess    EKG Interpretation  EKG Interpretation    Interpreted by emergency department physician    Rhythm: sinus arrhythmia  Rate: normal  Axis: normal  Ectopy: none  Conduction: , QRS 78, QTc 408  ST Segments: normal  T Waves: non specific changes  Q Waves: none    EKG  Impression: non-specific EKG and sinus arrhythmia    Anali Dc MD      Interpreted by me      Candido Mcburney, MD, [place] [facility]  Attending Emergency Physician         Anali Dc MD  02/25/22 1011 Paramedian Forehead Flap Text: A decision was made to reconstruct the defect utilizing an interpolation axial flap and a staged reconstruction.  A telfa template was made of the defect.  This telfa template was then used to outline the paramedian forehead pedicle flap.  The donor area for the pedicle flap was then injected with anesthesia.  The flap was excised through the skin and subcutaneous tissue down to the layer of the underlying musculature.  The pedicle flap was carefully excised within this deep plane to maintain its blood supply.  The edges of the donor site were undermined.   The donor site was closed in a primary fashion.  The pedicle was then rotated into position and sutured.  Once the tube was sutured into place, adequate blood supply was confirmed with blanching and refill.  The pedicle was then wrapped with xeroform gauze and dressed appropriately with a telfa and gauze bandage to ensure continued blood supply and protect the attached pedicle.

## 2022-02-25 NOTE — ED NOTES
Pt. Up to use restroom on her own.  Pt gait steady and NAD noted     Sandra Murrell RN  02/25/22 6765

## 2022-02-25 NOTE — ED NOTES
Pt. Discharge instructions reviewed and patient acknowledged understanding.      Chloé Alonso RN  02/25/22 1752

## 2022-02-25 NOTE — ED NOTES
Pt. Presents to the ER for ams after [place] and not responding to ems on scene. Pt on arrival is able to asnwer questions and follow commands. Pt. Then not able to answer questions and not responding appropriately. Pt is hypertensive and tachycardic on monitor. Dr. Harvey Collar at the bedside for evaluation. Bed in lowest position, wheels locked, side rials up x2, and call light within reach.      Colin Swanson RN  02/25/22 0813       Colin Swanson RN  02/25/22 0026

## 2022-02-25 NOTE — ED PROVIDER NOTES
[facility]  ED  Emergency Department Encounter  EmergencyMedicine Resident     Pt Name:Sabrina Magaña  MRN: [id number]  [place] 1990  Date of evaluation: 2/25/22  PCP:  Jose Andrea MD    [address]       Chief Complaint   Patient presents with    Altered Mental Status       HISTORY OF PRESENT ILLNESS  (Location/Symptom, Timing/Onset, Context/Setting, Quality, Duration, Modifying Factors, Severity.)    This patient was evaluated in the Emergency Department for symptoms described in the history of present illness. He/she was evaluated in the context of the global COVID-19 pandemic, which necessitated consideration that the patient might be at risk for infection with the SARS-CoV-2 virus that causes COVID-19. Institutional protocols and algorithms that pertain to the evaluation of patients at risk for COVID-19 are in a state of rapid change based on information released by regulatory bodies including the CDC and federal and state organizations. These policies and algorithms were followed during the patient's care in the ED. Gabe Hilario is a 36 y.o. female who presents to the ED today with complaints of altered mental status after being found at a bus stop/station. Reportedly been with her boyfriend. History is limited due to altered mental status per EMS patient is alert but intermittently confused and intermittently able to follow commands. Possible vision changes as well as auditory hallucinations. Patient denying any drugs or alcohol. Denies any headache, traumatic injuries, nausea, vomiting, abdominal pain. Initially stating that she might be pregnant but states that she is on her menstrual cycle has no abdominal pain at this time. PAST MEDICAL / SURGICAL / SOCIAL / FAMILY HISTORY      has a past medical history of Bipolar disorder ([address] Depression, Obesity, and Type 2 diabetes mellitus without complication, without long-term current use of insulin ([address]      has a past surgical history that includes Tonsillectomy and  section. Social History     Socioeconomic History    Marital status: Single     Spouse name: Not on file    Number of children: Not on file    Years of education: Not on file    Highest education level: Not on file   Occupational History    Not on file   Tobacco Use    Smoking status: Current Some Day Smoker     Packs/day: 0.10     Years: 1.00     Pack years: 0.10     Types: Cigarettes     Last attempt to quit: 2012     Years since quittin.1    Smokeless tobacco: Never Used    Tobacco comment: Black and milds    Substance and Sexual Activity    Alcohol use: No     Alcohol/week: 0.0 standard drinks    Drug use: No    Sexual activity: Not Currently     Partners: Male   Other Topics Concern    Not on file   Social History Narrative    Stay at home, takes care of child. Lives with mother. Social Determinants of Health     Financial Resource Strain:     Difficulty of Paying Living Expenses: Not on file   Food Insecurity:     Worried About Running Out of Food in the Last Year: Not on file    Kenneth of Food in the Last Year: Not on file   Transportation Needs:     Lack of Transportation (Medical): Not on file    Lack of Transportation (Non-Medical):  Not on file   Physical Activity:     Days of Exercise per Week: Not on file    Minutes of Exercise per Session: Not on file   Stress:     Feeling of Stress : Not on file   Social Connections:     Frequency of Communication with Friends and Family: Not on file    Frequency of Social Gatherings with Friends and Family: Not on file    Attends Quaker Services: Not on file    Active Member of Clubs or Organizations: Not on file    Attends Club or Organization Meetings: Not on file    Marital Status: Not on file   Intimate Partner Violence:     Fear of Current or Ex-Partner: Not on file    Emotionally Abused: Not on file    Physically Abused: Not on file   Blanco Sexually Abused: Not on file   Housing Stability:     Unable to Pay for Housing in the Last Year: Not on file    Number of [place] in the Last Year: Not on file    Unstable Housing in the Last Year: Not on file       Family History   Problem Relation Age of Onset    Cancer Maternal Grandmother         skin cancer    Breast Cancer Neg Hx     Colon Cancer Neg Hx     Diabetes Neg Hx     Eclampsia Neg Hx     Hypertension Neg Hx     Ovarian Cancer Neg Hx      Labor Neg Hx     Spont Abortions Neg Hx     Stroke Neg Hx        Allergies:  Patient has no known allergies. Home Medications:  Prior to Admission medications    Medication Sig Start Date End Date Taking? Authorizing Provider   citalopram (CELEXA) 20 MG tablet Take 1 tablet by mouth daily 3/9/21   Shaune Days, APRN - CNP   metFORMIN (GLUCOPHAGE) 500 MG tablet Take 1 tablet by mouth 2 times daily (with meals) 3/9/21   Shaune Days, APRN - CNP   INVEGA SUSTENNA 117 MG/0.75ML SONAM IM injection  3/3/21   Historical Provider, MD   ondansetron (ZOFRAN-ODT) 4 MG disintegrating tablet Take 4 mg by mouth 18   Historical Provider, MD   ibuprofen (ADVIL;MOTRIN) 800 MG tablet take 1 tablet by mouth every 8 hours if needed for pain 20   Dre Theodore MD   risperiDONE (RISPERDAL) 1 MG tablet Take 1 tablet by mouth daily 18   Historical Provider, MD       REVIEW OF SYSTEMS    (2-9 systems for level 4, 10 or more for level 5)      Limited due to clinical condition    Review of Systems   Eyes: Positive for visual disturbance. Cardiovascular: Negative for chest pain. Gastrointestinal: Negative for abdominal pain. Neurological: Positive for numbness (Diffuse, all extremities). Psychiatric/Behavioral: Positive for confusion. PHYSICAL EXAM   (up to 7 for level 4, 8 or more for level 5)      INITIAL VITALS:   BP (!) 157/87   Pulse 109   Temp 98.4 °F (36.9 °C)   Resp 18   SpO2 94%     Physical Exam  Vitals reviewed.    Constitutional: General: She is not in acute distress. Appearance: She is obese. She is ill-appearing. She is not toxic-appearing. Comments: Laying in bed, alert and awake, intermittently following commands and answering questions appropriately   HENT:      Head: Normocephalic and atraumatic. Nose: Nose normal.      Mouth/Throat:      Mouth: Mucous membranes are dry. Pharynx: No oropharyngeal exudate or posterior oropharyngeal erythema. Eyes:      Extraocular Movements: Extraocular movements intact. Conjunctiva/sclera: Conjunctivae normal.      Pupils: Pupils are equal, round, and reactive to light. Comments: Pupils 3 mm bilaterally. Does not blink to visual threat   Neck:      Comments: No meningismus  Cardiovascular:      Rate and Rhythm: Regular rhythm. Tachycardia present. Pulses: Normal pulses. Pulmonary:      Effort: Pulmonary effort is normal. No respiratory distress. Breath sounds: No stridor. No wheezing, rhonchi or rales. Abdominal:      General: There is no distension. Palpations: Abdomen is soft. Tenderness: There is no abdominal tenderness. There is no guarding or rebound. Comments: Nongravid abdomen. No pulsatile mass. Musculoskeletal:         General: No swelling or deformity. Normal range of motion. Cervical back: Normal range of motion and neck supple. No tenderness. No muscular tenderness. Skin:     General: Skin is warm and dry. Findings: No rash. Neurological:      Mental Status: She is alert. Comments: Confused. Oriented to self and place. Intermittently following commands appropriately. Is speaking with comprehensible sentences but only intermittently making sense. Gross sensation to noxious stimuli is intact in all extremities as his gross motor tone.      Psychiatric:         Behavior: Behavior normal.         DIFFERENTIAL  DIAGNOSIS     PLAN (LABS / IMAGING / EKG):  Orders Placed This Encounter   Procedures    XR CHEST PORTABLE    CT HEAD WO CONTRAST    CBC with Auto Differential    Basic Metabolic Panel w/ Reflex to MG    Hepatic Function Panel    TOX SCR, BLD, ED    Urinalysis with Reflex to Culture    HCG Qualitative, Serum    TSH with Reflex    BLOOD GAS, VENOUS    Osmolality    POCT glucose    POC Glucose Fingerstick    EKG 12 Lead       MEDICATIONS ORDERED:  Orders Placed This Encounter   Medications    LORazepam (ATIVAN) injection 1 mg         DIAGNOSTIC RESULTS / EMERGENCY DEPARTMENT COURSE / MDM     Results for orders placed or performed during the hospital encounter of 02/25/22   CBC with Auto Differential   Result Value Ref Range    WBC 10.2 3.5 - 11.3 k/uL    RBC 4.57 3.95 - 5.11 m/uL    Hemoglobin 13.0 11.9 - 15.1 g/dL    Hematocrit 41.2 36.3 - 47.1 %    MCV 90.2 82.6 - 102.9 fL    MCH 28.4 25.2 - 33.5 pg    MCHC 31.6 28.4 - 34.8 g/dL    RDW 13.2 11.8 - 14.4 %    Platelets 747 667 - 029 k/uL    MPV 11.3 8.1 - 13.5 fL    NRBC Automated 0.0 0.0 per 100 WBC    Seg Neutrophils 72 (H) 36 - 65 %    Lymphocytes 20 (L) 24 - 43 %    Monocytes 8 3 - 12 %    Eosinophils % 0 (L) 1 - 4 %    Basophils 0 0 - 2 %    Immature Granulocytes 0 0 %    Segs Absolute 7.32 1.50 - 8.10 k/uL    Absolute Lymph # 2.02 1.10 - 3.70 k/uL    Absolute Mono # 0.80 0.10 - 1.20 k/uL    Absolute Eos # <0.03 0.00 - 0.44 k/uL    Basophils Absolute <0.03 0.00 - 0.20 k/uL    Absolute Immature Granulocyte 0.03 0.00 - 0.30 k/uL   Basic Metabolic Panel w/ Reflex to MG   Result Value Ref Range    Glucose 225 (H) 70 - 99 mg/dL    BUN 9 6 - 20 mg/dL    CREATININE 0.79 0.50 - 0.90 mg/dL    Calcium 9.8 8.6 - 10.4 mg/dL    Sodium 140 135 - 144 mmol/L    Potassium 3.6 (L) 3.7 - 5.3 mmol/L    Chloride 104 98 - 107 mmol/L    CO2 21 20 - 31 mmol/L    Anion Gap 15 9 - 17 mmol/L    GFR Non-African American >60 >60 mL/min    GFR African American >60 >60 mL/min    GFR Comment         Hepatic Function Panel   Result Value Ref Range    Albumin 4.7 3.5 - 5.2 g/dL    Alkaline Phosphatase 66 35 - 104 U/L    ALT 22 5 - 33 U/L    AST 20 <32 U/L    Total Bilirubin 0.62 0.3 - 1.2 mg/dL    Bilirubin, Direct 0.11 <0.31 mg/dL    Bilirubin, Indirect 0.51 0.00 - 1.00 mg/dL    Total Protein 8.4 (H) 6.4 - 8.3 g/dL    Albumin/Globulin Ratio 1.3 1.0 - 2.5   TOX SCR, BLD, ED   Result Value Ref Range    Acetaminophen Level <5 (L) 10 - 30 ug/mL    Ethanol <10 <10 mg/dL    Ethanol percent <9.428 <1.911 %    Salicylate Lvl <1 (L) 3 - 10 mg/dL    Toxic Tricyclic Sc,Blood NEGATIVE NEGATIVE   HCG Qualitative, Serum   Result Value Ref Range    hCG Qual NEGATIVE NEGATIVE   TSH with Reflex   Result Value Ref Range    TSH 1.08 0.30 - 5.00 mIU/L   BLOOD GAS, VENOUS   Result Value Ref Range    pH, Gautam 7.374 7.320 - 7.420    pCO2, Gautam 38.3 (L) 39 - 55    pO2, Gautam 104.0 (H) 30 - 50    HCO3, Venous 21.8 (L) 24 - 30 mmol/L    Negative Base Excess, Gautam 2.5 (H) 0.0 - 2.0 mmol/L    O2 Sat, Gautam 98.3 (H) 60.0 - 85.0 %    Carboxyhemoglobin 2.3 0 - 5 %    Pt Temp 37.0     FIO2 UNKNOWN    Osmolality   Result Value Ref Range    Serum Osmolality 316 (H) 275 - 295 mOsm/kg   POCT glucose   Result Value Ref Range    Glucose 211 mg/dL    QC OK? okay    POC Glucose Fingerstick   Result Value Ref Range    POC Glucose 211 (H) 65 - 105 mg/dL         RADIOLOGY:  CT HEAD WO CONTRAST   Final Result   No acute intracranial abnormality. XR CHEST PORTABLE   Final Result   Negative portable chest.              EKG  Normal sinus rhythm, nonspecific T wave changes, no acute ST elevation, QTc 408    All EKG's are interpreted by the Emergency Department Physician who either signs or Co-signs this chart in the absence of a cardiologist.    IMPRESSION/MDM/EMERGENCY DEPARTMENT COURSE:  Patient came to emergency department, HPI and physical exam were conducted. All nursing notes were reviewed. 72-year-old female presenting for altered mental status. Limited history. Hypertensive 163/79. Mild tachycardia.   Alert and answering questions. No focal deficits. No signs of trauma. Is afebrile. Glucose prior to arrival was over 200    Differential includes toxidrome, metabolic encephalopathy, overdose. Demonstrating sympathomimetic toxidrome at this time as well as some anxiety, will give Ativan. Broad work-up including TSH. Lower suspicion for infectious etiology at this time. We will continue telemetry monitoring. Patient is alert and awake and protecting airway at this time       ED Course as of 02/25/22 1748   Fri Feb 25, 2022   0916 Acetaminophen Level(!): <5 [ZT]   0916 Ethanol: <10  CBC and BMP grossly unremarkable. LFTs within normal limits. Negative Tylenol and ethanol levels [ZT]   0942 Patient is more alert. No longer hypertensive or tachycardic. Laying in bed comfortably. Wakes to verbal stimuli and answers questions appropriately. Vision seems to be improving and is able to see how many fingers I am holding up at this time with good gaze tracking [ZT]      ED Course User Index  [ZT] Graham Maravilla,        Work-up in the emergency department grossly unremarkable including negative CT head, negative VBG including carboxyhemoglobin, negative hCG, unremarkable LFTs, negative TSH, negative ethanol, negative Tylenol and salicylates, and no signs of infectious etiology. Throughout emergency department stay patient progressively became more alert and oriented. Vision changes seem to have completely resolved. Patient's significant other is present. She recognizes him and lives with him. He states that she is back to baseline. Patient also agrees with the exception of some very mild dizziness that she feels back to baseline. Was able to ambulate around emergency department with stable gait. Tolerating p.o. intake. She was questioned independent of significant other and states that she feels safe at home and feels safe going home with him. Low suspicion for abuse.   At this time will discharge patient home.  Strict return cautions and follow-up instructions provided.     FINAL IMPRESSION      1. Episode of confusion          DISPOSITION / PLAN     DISPOSITION Decision To Discharge 02/25/2022 01:17:55 PM      PATIENT REFERRED TO:  Bernard Officer, MD [place] [address] [place] [address]  [phone]    Schedule an appointment as soon as possible for a visit       [facility]  [address]  [phone]    As needed, If symptoms worsen      DISCHARGE MEDICATIONS:  Discharge Medication List as of 2/25/2022  1:19 PM          Graham Maravilla DO  Emergency Medicine Resident    (Please note that portions of thisnote were completed with a voice recognition program.  Efforts were made to edit the dictations but occasionally words are mis-transcribed.)       Graham Maravilla DO  Resident  02/25/22 8986

## 2022-02-26 LAB
EKG ATRIAL RATE: 99 BPM
EKG P AXIS: 68 DEGREES
EKG P-R INTERVAL: 134 MS
EKG Q-T INTERVAL: 318 MS
EKG QRS DURATION: 78 MS
EKG QTC CALCULATION (BAZETT): 408 MS
EKG R AXIS: 70 DEGREES
EKG T AXIS: 33 DEGREES
EKG VENTRICULAR RATE: 99 BPM

## 2022-03-20 ENCOUNTER — APPOINTMENT (OUTPATIENT)
Dept: CT IMAGING | Age: 32
End: 2022-03-20
Payer: COMMERCIAL

## 2022-03-20 ENCOUNTER — HOSPITAL ENCOUNTER (EMERGENCY)
Age: 32
Discharge: HOME | End: 2022-03-20
Attending: EMERGENCY MEDICINE
Payer: COMMERCIAL

## 2022-03-20 VITALS
SYSTOLIC BLOOD PRESSURE: 147 MMHG | RESPIRATION RATE: 16 BRPM | DIASTOLIC BLOOD PRESSURE: 98 MMHG | TEMPERATURE: 98.2 F | OXYGEN SATURATION: 98 % | HEART RATE: 85 BPM

## 2022-03-20 DIAGNOSIS — R41.0 ACUTE DELIRIUM: Primary | ICD-10-CM

## 2022-03-20 LAB
ABSOLUTE EOS #: <0.03 K/UL (ref 0–0.44)
ABSOLUTE IMMATURE GRANULOCYTE: <0.03 K/UL (ref 0–0.3)
ABSOLUTE LYMPH #: 1.63 K/UL (ref 1.1–3.7)
ABSOLUTE MONO #: 0.53 K/UL (ref 0.1–1.2)
ACETAMINOPHEN LEVEL: <5 UG/ML (ref 10–30)
ALBUMIN SERPL-MCNC: 4.1 G/DL (ref 3.5–5.2)
ALBUMIN/GLOBULIN RATIO: 1.2 (ref 1–2.5)
ALP BLD-CCNC: 73 U/L (ref 35–104)
ALT SERPL-CCNC: 44 U/L (ref 5–33)
AMMONIA: 37 UMOL/L (ref 11–51)
ANION GAP SERPL CALCULATED.3IONS-SCNC: 14 MMOL/L (ref 9–17)
AST SERPL-CCNC: 16 U/L
BASOPHILS # BLD: 0 % (ref 0–2)
BASOPHILS ABSOLUTE: 0.03 K/UL (ref 0–0.2)
BILIRUB SERPL-MCNC: 0.54 MG/DL (ref 0.3–1.2)
BUN BLDV-MCNC: 9 MG/DL (ref 6–20)
CALCIUM SERPL-MCNC: 9.4 MG/DL (ref 8.6–10.4)
CHLORIDE BLD-SCNC: 103 MMOL/L (ref 98–107)
CHP ED QC CHECK: NORMAL
CO2: 22 MMOL/L (ref 20–31)
CREAT SERPL-MCNC: 0.65 MG/DL (ref 0.5–0.9)
EOSINOPHILS RELATIVE PERCENT: 0 % (ref 1–4)
ETHANOL PERCENT: <0.01 %
ETHANOL: <10 MG/DL
GFR AFRICAN AMERICAN: >60 ML/MIN
GFR NON-AFRICAN AMERICAN: >60 ML/MIN
GFR SERPL CREATININE-BSD FRML MDRD: ABNORMAL ML/MIN/{1.73_M2}
GLUCOSE BLD-MCNC: 246 MG/DL
GLUCOSE BLD-MCNC: 256 MG/DL (ref 70–99)
HCG QUALITATIVE: NEGATIVE
HCT VFR BLD CALC: 42.3 % (ref 36.3–47.1)
HEMOGLOBIN: 13.9 G/DL (ref 11.9–15.1)
IMMATURE GRANULOCYTES: 0 %
LYMPHOCYTES # BLD: 21 % (ref 24–43)
MCH RBC QN AUTO: 29.1 PG (ref 25.2–33.5)
MCHC RBC AUTO-ENTMCNC: 32.9 G/DL (ref 28.4–34.8)
MCV RBC AUTO: 88.7 FL (ref 82.6–102.9)
MONOCYTES # BLD: 7 % (ref 3–12)
NRBC AUTOMATED: 0 PER 100 WBC
PDW BLD-RTO: 12.4 % (ref 11.8–14.4)
PLATELET # BLD: 254 K/UL (ref 138–453)
PMV BLD AUTO: 11.4 FL (ref 8.1–13.5)
POTASSIUM SERPL-SCNC: 3.4 MMOL/L (ref 3.7–5.3)
RBC # BLD: 4.77 M/UL (ref 3.95–5.11)
SALICYLATE LEVEL: <1 MG/DL (ref 3–10)
SEG NEUTROPHILS: 72 % (ref 36–65)
SEGMENTED NEUTROPHILS ABSOLUTE COUNT: 5.45 K/UL (ref 1.5–8.1)
SODIUM BLD-SCNC: 139 MMOL/L (ref 135–144)
TOTAL PROTEIN: 7.6 G/DL (ref 6.4–8.3)
TOXIC TRICYCLIC SC,BLOOD: NEGATIVE
WBC # BLD: 7.7 K/UL (ref 3.5–11.3)

## 2022-03-20 PROCEDURE — G0480 DRUG TEST DEF 1-7 CLASSES: HCPCS

## 2022-03-20 PROCEDURE — 80143 DRUG ASSAY ACETAMINOPHEN: CPT

## 2022-03-20 PROCEDURE — 85025 COMPLETE CBC W/AUTO DIFF WBC: CPT

## 2022-03-20 PROCEDURE — 84703 CHORIONIC GONADOTROPIN ASSAY: CPT

## 2022-03-20 PROCEDURE — 70450 CT HEAD/BRAIN W/O DYE: CPT

## 2022-03-20 PROCEDURE — 80179 DRUG ASSAY SALICYLATE: CPT

## 2022-03-20 PROCEDURE — 6370000000 HC RX 637 (ALT 250 FOR IP): Performed by: EMERGENCY MEDICINE

## 2022-03-20 PROCEDURE — 80307 DRUG TEST PRSMV CHEM ANLYZR: CPT

## 2022-03-20 PROCEDURE — 93005 ELECTROCARDIOGRAM TRACING: CPT | Performed by: EMERGENCY MEDICINE

## 2022-03-20 PROCEDURE — 99283 EMERGENCY DEPT VISIT LOW MDM: CPT

## 2022-03-20 PROCEDURE — 82140 ASSAY OF AMMONIA: CPT

## 2022-03-20 PROCEDURE — 80053 COMPREHEN METABOLIC PANEL: CPT

## 2022-03-20 PROCEDURE — 82947 ASSAY GLUCOSE BLOOD QUANT: CPT

## 2022-03-20 PROCEDURE — 2580000003 HC RX 258: Performed by: EMERGENCY MEDICINE

## 2022-03-20 RX ORDER — POTASSIUM CHLORIDE 20 MEQ/1
40 TABLET, EXTENDED RELEASE ORAL 2 TIMES DAILY WITH MEALS
Status: DISCONTINUED | OUTPATIENT
Start: 2022-03-20 | End: 2022-03-20

## 2022-03-20 RX ORDER — SODIUM CHLORIDE, SODIUM LACTATE, POTASSIUM CHLORIDE, AND CALCIUM CHLORIDE .6; .31; .03; .02 G/100ML; G/100ML; G/100ML; G/100ML
500 INJECTION, SOLUTION INTRAVENOUS ONCE
Status: COMPLETED | OUTPATIENT
Start: 2022-03-20 | End: 2022-03-20

## 2022-03-20 RX ORDER — SODIUM CHLORIDE, SODIUM LACTATE, POTASSIUM CHLORIDE, AND CALCIUM CHLORIDE .6; .31; .03; .02 G/100ML; G/100ML; G/100ML; G/100ML
1000 INJECTION, SOLUTION INTRAVENOUS ONCE
Status: DISCONTINUED | OUTPATIENT
Start: 2022-03-20 | End: 2022-03-20

## 2022-03-20 RX ADMIN — SODIUM CHLORIDE, POTASSIUM CHLORIDE, SODIUM LACTATE AND CALCIUM CHLORIDE 500 ML: 600; 310; 30; 20 INJECTION, SOLUTION INTRAVENOUS at 14:14

## 2022-03-20 RX ADMIN — SODIUM CHLORIDE, POTASSIUM CHLORIDE, SODIUM LACTATE AND CALCIUM CHLORIDE 500 ML: 600; 310; 30; 20 INJECTION, SOLUTION INTRAVENOUS at 15:17

## 2022-03-20 RX ADMIN — POTASSIUM BICARBONATE 40 MEQ: 782 TABLET, EFFERVESCENT ORAL at 16:47

## 2022-03-20 ASSESSMENT — ENCOUNTER SYMPTOMS
CONSTIPATION: 0
VOMITING: 0
SHORTNESS OF BREATH: 0
ABDOMINAL PAIN: 0
DIARRHEA: 0
NAUSEA: 0
COLOR CHANGE: 0
CHEST TIGHTNESS: 0

## 2022-03-20 NOTE — ED PROVIDER NOTES
[facility] ED  Emergency Department Encounter  EmergencyMedicine Resident     Pt Name:Sabrina Last  MRN: [id number]  Stefanygfeamon 1990  Date of evaluation: 3/20/22  PCP:  Gail Seth MD    [address] [place] [address]       Chief Complaint   Patient presents with   [facility] Current Psychiatric Evaluation       HISTORY OF PRESENT ILLNESS  (Location/Symptom, Timing/Onset, Context/Setting, Quality, Duration, Modifying Factors, Severity.)      Debbie Perez is a 36 y.o. female  who presents with generalized not feeling well. Patient states that she feels depressed at times. Patient alert to person place but not time, unable to answer questions regarding month date or year, states she just cannot remember. Patient answered 100 number of quarters in a $1.50. Patient states concerns that she is out of her psychiatric medications, does not know when she last took them, does not know what she takes regularly. Patient states that the month since she has had her medications. Patient denies any lightheadedness dizziness, chest pain, shortness of breath, abdominal pain, change in bowel habits. Patient does state that she has not been able to urinate for 2 weeks but then also states last time she urinated was today when refreezing the question. Patient denies any pain with urination just says she cannot go. Patient also states that she is pregnant but is unable to identify when she was told she was pregnant, how far along she is, or if she seen anybody for the pregnancy she also states she cannot remember. PAST MEDICAL / SURGICAL / SOCIAL / FAMILY HISTORY      has a past medical history of Bipolar disorder ([address] Depression, Obesity, and Type 2 diabetes mellitus without complication, without long-term current use of insulin ([address] No additional pertinent      has a past surgical history that includes Tonsillectomy and  section.   No additional pertinent    Social History     Socioeconomic History    Marital status: Single     Spouse name: Not on file    Number of children: Not on file    Years of education: Not on file    Highest education level: Not on file   Occupational History    Not on file   Tobacco Use    Smoking status: Current Some Day Smoker     Packs/day: 0.10     Years: 1.00     Pack years: 0.10     Types: Cigarettes     Last attempt to quit: 2012     Years since quittin.2    Smokeless tobacco: Never Used    Tobacco comment: Black and milds    Substance and Sexual Activity    Alcohol use: No     Alcohol/week: 0.0 standard drinks    Drug use: No    Sexual activity: Not Currently     Partners: Male   Other Topics Concern    Not on file   Social History Narrative    Stay at home, takes care of child. Lives with mother. Social Determinants of Health     Financial Resource Strain:     Difficulty of Paying Living Expenses: Not on file   Food Insecurity:     Worried About Running Out of Food in the Last Year: Not on file    Kenneth of Food in the Last Year: Not on file   Transportation Needs:     Lack of Transportation (Medical): Not on file    Lack of Transportation (Non-Medical):  Not on file   Physical Activity:     Days of Exercise per Week: Not on file    Minutes of Exercise per Session: Not on file   Stress:     Feeling of Stress : Not on file   Social Connections:     Frequency of Communication with Friends and Family: Not on file    Frequency of Social Gatherings with Friends and Family: Not on file    Attends Worship Services: Not on file    Active Member of Clubs or Organizations: Not on file    Attends Club or Organization Meetings: Not on file    Marital Status: Not on file   Intimate Partner Violence:     Fear of Current or Ex-Partner: Not on file    Emotionally Abused: Not on file    Physically Abused: Not on file    Sexually Abused: Not on file   Housing Stability:     Unable to Pay for Housing in the Last Year: Not on file    Number of Places Lived in the Last Year: Not on file    Unstable Housing in the Last Year: Not on file       Family History   Problem Relation Age of Onset    Cancer Maternal Grandmother         skin cancer    Breast Cancer Neg Hx     Colon Cancer Neg Hx     Diabetes Neg Hx     Eclampsia Neg Hx     Hypertension Neg Hx     Ovarian Cancer Neg Hx      Labor Neg Hx     Spont Abortions Neg Hx     Stroke Neg Hx        Allergies:  Patient has no known allergies. Home Medications:  Prior to Admission medications    Medication Sig Start Date End Date Taking? Authorizing Provider   citalopram (CELEXA) 20 MG tablet Take 1 tablet by mouth daily 3/9/21   TIEN Ramos CNP   metFORMIN (GLUCOPHAGE) 500 MG tablet Take 1 tablet by mouth 2 times daily (with meals) 3/9/21   TIEN Ramos CNP   INVEGA SUSTENNA 117 MG/0.75ML SONAM IM injection  3/3/21   Historical Provider, MD   ondansetron (ZOFRAN-ODT) 4 MG disintegrating tablet Take 4 mg by mouth 18   Historical Provider, MD   ibuprofen (ADVIL;MOTRIN) 800 MG tablet take 1 tablet by mouth every 8 hours if needed for pain 20   Glenda Kussmaul, MD   risperiDONE (RISPERDAL) 1 MG tablet Take 1 tablet by mouth daily 18   Historical Provider, MD       REVIEW OF SYSTEMS    (2-9 systems for level 4, 10 or more for level 5)      Review of Systems   Constitutional: Positive for fatigue. Negative for chills and fever. HENT: Negative for congestion. Respiratory: Negative for chest tightness and shortness of breath. Cardiovascular: Negative for chest pain and leg swelling. Gastrointestinal: Negative for abdominal pain, constipation, diarrhea, nausea and vomiting. Endocrine: Negative for polyuria. Genitourinary: Positive for difficulty urinating. Skin: Negative for color change. Neurological: Negative for dizziness, weakness, light-headedness and headaches. Psychiatric/Behavioral: Positive for confusion and sleep disturbance (increased sleep). PHYSICAL EXAM   (up to 7 for level 4, 8 or more for level 5)      INITIAL VITALS:   BP (!) 147/98   Pulse 85   Temp 98.2 °F (36.8 °C) (Oral)   Resp 16   SpO2 98%     Physical Exam  Constitutional:       Appearance: Normal appearance. She is obese. HENT:      Head: Normocephalic and atraumatic. Mouth/Throat:      Mouth: Mucous membranes are moist.      Pharynx: Oropharynx is clear. Eyes:      Extraocular Movements: Extraocular movements intact. Conjunctiva/sclera: Conjunctivae normal.   Cardiovascular:      Rate and Rhythm: Normal rate and regular rhythm. Pulses: Normal pulses. Heart sounds: Normal heart sounds. No murmur heard. Pulmonary:      Effort: Pulmonary effort is normal.      Breath sounds: Normal breath sounds. Abdominal:      General: Bowel sounds are normal. There is no distension. Tenderness: There is no abdominal tenderness. There is no guarding. Musculoskeletal:         General: Normal range of motion. Comments: Range of motion noted to be normal with patient's natural movements   Skin:     General: Skin is warm and dry. Findings: No rash (On exposed skin). Neurological:      General: No focal deficit present. Mental Status: She is alert. She is disoriented and confused. GCS: GCS eye subscore is 4. GCS verbal subscore is 5. GCS motor subscore is 6. Cranial Nerves: No dysarthria or facial asymmetry. Sensory: Sensation is intact. Motor: No weakness. Gait: Gait normal.   Psychiatric:         Mood and Affect: Mood is depressed. Affect is flat. Speech: Speech is delayed. Behavior: Behavior is slowed. Cognition and Memory: She exhibits impaired recent memory and impaired remote memory.          DIFFERENTIAL  DIAGNOSIS     PLAN (LABS / IMAGING / EKG):  Orders Placed This Encounter   Procedures    CT HEAD WO CONTRAST    TOX SCR, BLD, ED    Comprehensive Metabolic Panel    Ammonia    CBC with Auto Differential    HCG Qualitative, Serum    POCT glucose    EKG 12 Lead       MEDICATIONS ORDERED:  Orders Placed This Encounter   Medications    lactated ringers bolus    DISCONTD: lactated ringers bolus    DISCONTD: potassium chloride (KLOR-CON M) extended release tablet 40 mEq    lactated ringers bolus    potassium bicarb-citric acid (EFFER-K) effervescent tablet 40 mEq       DIAGNOSTIC RESULTS / EMERGENCY DEPARTMENT COURSE / MDM   LAB RESULTS:  Results for orders placed or performed during the hospital encounter of 03/20/22   TOX SCR, BLD, ED   Result Value Ref Range    Acetaminophen Level <5 (L) 10 - 30 ug/mL    Ethanol <10 <10 mg/dL    Ethanol percent <6.610 <6.179 %    Salicylate Lvl <1 (L) 3 - 10 mg/dL    Toxic Tricyclic Sc,Blood NEGATIVE NEGATIVE   Comprehensive Metabolic Panel   Result Value Ref Range    Glucose 256 (H) 70 - 99 mg/dL    BUN 9 6 - 20 mg/dL    CREATININE 0.65 0.50 - 0.90 mg/dL    Calcium 9.4 8.6 - 10.4 mg/dL    Sodium 139 135 - 144 mmol/L    Potassium 3.4 (L) 3.7 - 5.3 mmol/L    Chloride 103 98 - 107 mmol/L    CO2 22 20 - 31 mmol/L    Anion Gap 14 9 - 17 mmol/L    Alkaline Phosphatase 73 35 - 104 U/L    ALT 44 (H) 5 - 33 U/L    AST 16 <32 U/L    Total Bilirubin 0.54 0.3 - 1.2 mg/dL    Total Protein 7.6 6.4 - 8.3 g/dL    Albumin 4.1 3.5 - 5.2 g/dL    Albumin/Globulin Ratio 1.2 1.0 - 2.5    GFR Non-African American >60 >60 mL/min    GFR African American >60 >60 mL/min    GFR Comment         Ammonia   Result Value Ref Range    Ammonia 37 11 - 51 umol/L   CBC with Auto Differential   Result Value Ref Range    WBC 7.7 3.5 - 11.3 k/uL    RBC 4.77 3.95 - 5.11 m/uL    Hemoglobin 13.9 11.9 - 15.1 g/dL    Hematocrit 42.3 36.3 - 47.1 %    MCV 88.7 82.6 - 102.9 fL    MCH 29.1 25.2 - 33.5 pg    MCHC 32.9 28.4 - 34.8 g/dL    RDW 12.4 11.8 - 14.4 %    Platelets 816 818 - 780 k/uL    MPV 11.4 8.1 - 13.5 fL    NRBC Automated 0.0 0.0 per 100 WBC    Seg Neutrophils 72 (H) 36 - 65 %    Lymphocytes 21 (L) 24 - 43 %    Monocytes 7 3 - 12 %    Eosinophils % 0 (L) 1 - 4 %    Basophils 0 0 - 2 %    Immature Granulocytes 0 0 %    Segs Absolute 5.45 1.50 - 8.10 k/uL    Absolute Lymph # 1.63 1.10 - 3.70 k/uL    Absolute Mono # 0.53 0.10 - 1.20 k/uL    Absolute Eos # <0.03 0.00 - 0.44 k/uL    Basophils Absolute 0.03 0.00 - 0.20 k/uL    Absolute Immature Granulocyte <0.03 0.00 - 0.30 k/uL   HCG Qualitative, Serum   Result Value Ref Range    hCG Qual NEGATIVE NEGATIVE   POCT glucose   Result Value Ref Range    Glucose 246 mg/dL    QC OK? ok    EKG 12 Lead   Result Value Ref Range    Ventricular Rate 86 BPM    Atrial Rate 86 BPM    P-R Interval 146 ms    QRS Duration 68 ms    Q-T Interval 344 ms    QTc Calculation (Bazett) 411 ms    P Axis 6 degrees    R Axis 32 degrees    T Axis 23 degrees       RADIOLOGY:  CT HEAD WO CONTRAST   Final Result   No acute intracranial abnormality. EKG  EKG Interpretation    Interpreted by emergency department physician    Rhythm: normal sinus   Rate: normal  Axis: normal  Ectopy: none  Conduction: normal  ST Segments: normal  T Waves: normal  Q Waves: none    Clinical Impression: non-specific EKG    Diego Hammonds,      All EKG's are interpreted by the Emergency Department Physician who either signs or Co-signs this chart in the absence of a cardiologist.        PROCEDURES:  none    CONSULTS:  None    MEDICAL DECISION MAKING:  Patient presenting for being out of her psych meds. Patient noted to be slow to answer questions, slightly withdrawn, patient alert and oriented to place and person but not time. Patient unknown what her medications are, unknown when she last took him. Patient denies any suicidal ideations or homicidal ideations, patient does not seem to be having acute psychosis at this time and and denied hallucinations, and other signs with evaluation. Altered mental status work-up was noted to be negative, labs within normal limits.   Social work discussed with patient about finding psychiatrist that can help manage patient's medications, patient will go to [facility] tomorrow per patient and per social work. Patient had stable vitals throughout stay, appeared generally well. Patient was discharged home for follow-up by psychiatry on Monday. Patient agreeable to the plan. Patient discharged home in stable condition. Return precautions for hallucinations, suicidal ideation, homicidal ideation, or other concerns were provided to patient and patient was agreeable and discharged home in stable condition. CRITICAL CARE:  Please see attending note    FINAL IMPRESSION      1.  Acute delirium          DISPOSITION / PLAN     DISPOSITION Decision To Discharge 03/20/2022 04:35:46 PM      PATIENT REFERRED TO:  Leopoldo Law MD  [facility] [address]  [facility]  [phone]    Schedule an appointment as soon as possible for a visit       [address] [address]  [phone]    Go in 1 day        DISCHARGE MEDICATIONS:  Discharge Medication List as of 3/20/2022  4:35 PM          [address] Beverly DO  Emergency Medicine Resident    (Please note that portions of thisnote were completed with a voice recognition program.  Efforts were made to edit the dictations but occasionally words are mis-transcribed.)       [address] Hugo Beverly DO  Resident  03/20/22 6298

## 2022-03-20 NOTE — ED NOTES
Pt. Resting on stretcher while watching TV  RR even and unlabored, BRANDY Grande, LOREN  03/20/22 0606

## 2022-03-20 NOTE — ED NOTES
Writer met with patient at bedside regarding concerns for mental health symptoms. Patient states that she came to the ER because her mother told her too. Patient presents with a flat affect and is slow to respond to questions. She potential is experiencing internal stimuli, although she denies. Patient does endorse that others try to control her thoughts and that this makes her feel confused. She is not able to elaborate or provide examples of this at this time. Patient denies thoughts of harm to self and others. She reports to living with her mother and feels safe at home. She denies harm from others or coercion from others. She reports a history with [facility] and states that she has not been there in 6 months. Patient states that she would benefit from starting back on medications and is in agreement to return to [facility] for services. Patient given resources regarding walk in times for [facility], starting tomorrow. Patient also provided with [facility] for additional mental health care 24 hours. She reports no further needs.       Emily Sewell, [address]  03/20/22 8049

## 2022-03-20 NOTE — ED NOTES
Pt. To ER room 34 from triage, ambulatory with steady gait  Pt. Presents with request for her medications she has been without for an unknown period of time  Pt. Has very flat affect and is slow to answer questions, answers most questions appropriately  Pt. Angelita Russell she lives with her mother in a safe environment, denies sexual or physical abuse and has one 6year old son who lives with his father. Pt. Denies suicidal and homicidal ideation consistently, pt states she gets depressed \"sometimes,\" but denies actively feeling hopeless or depressed. Pt. Denies drug or alcohol use. Pt. States she is actively pregnant but does not know how far along she is, pt amnestic to certain events as well as not knowing the year or date. Pt. Has no obvious deformities or injuries, she denies need for medical attention other than requesting her medications  Pt. Placed on continuous pulse ox on monitor, continuous NIBP  Pt.  Given call light and blankets      Waqas Gabriel RN  03/20/22 9493

## 2022-03-20 NOTE — ED NOTES
Writer scheduled transportation for patient at discharge through her insurance company.   Trip number [id number]     MICHELLE Leon  03/20/22 0804

## 2022-03-20 NOTE — ED PROVIDER NOTES
[facility] [facility]     Emergency Department     Faculty Attestation    I performed a history and physical examination of the patient and discussed management with the resident. I have reviewed and agree with the residents findings including all diagnostic interpretations, and treatment plans as written at the time of my review. Any areas of disagreement are noted on the chart. I was personally present for the key portions of any procedures. I have documented in the chart those procedures where I was not present during the key portions. For Physician Assistant/ Nurse Practitioner cases/documentation I have personally evaluated this patient and have completed at least one if not all key elements of the E/M (history, physical exam, and MDM). Additional findings are as noted. This patient was evaluated in the Emergency Department for symptoms described in the history of present illness. The patient was evaluated in the context of the global COVID-19 pandemic, which necessitated consideration that the patient might be at risk for infection with the SARS-CoV-2 virus that causes COVID-19. Institutional protocols and algorithms that pertain to the evaluation of patients at risk for COVID-19 are in a state of rapid change based on information released by regulatory bodies including the CDC and federal and state organizations. These policies and algorithms were followed during the patient's care in the ED. Primary Care Physician: Dexter Torres MD    History: This is a 36 y.o. female who presents to the Emergency Department with complaint of requesting psychiatric admission. Patient presents emergency department stating that her mom dropped her off so that she needs some psychiatric help. Patient denies any suicidal homicidal ideation. She is confused to year and month. However the patient does know where she is in the city she is in.   She has no complaints of chest pain shortness of breath abdominal pain. Physical:   oral temperature is 98.2 °F (36.8 °C). Her blood pressure is 143/101 (abnormal) and her pulse is 85. Her respiration is 16 and oxygen saturation is 97%. Lungs are clear auscultation bilateral, heart regular rate and rhythm, abdomen soft nontender patient moves all extremities well. Impression: History of psychiatric disorder, confusion    Plan: CBC, BMP, EKG, urinalysis, hCG,      EKG Interpretation    Interpreted by me  Normal sinus rhythm ventricular of 84, normal LA interval, normal QRS duration, normal axis, nonspecific ST and T wave abnormality, normal QT corrected  Compared EKG of February 25, 2022, ventricular rate has decreased by 15          (Please note that portions of this note were completed with a voice recognition program.  Efforts were made to edit the dictations but occasionally words are mis-transcribed.)    Annika Smoker.  Blas Faulkner MD, [facility]  Attending Emergency Medicine Physician        Esau Gonzalez MD  03/20/22 9750

## 2022-03-21 LAB
EKG ATRIAL RATE: 86 BPM
EKG P AXIS: 6 DEGREES
EKG P-R INTERVAL: 146 MS
EKG Q-T INTERVAL: 344 MS
EKG QRS DURATION: 68 MS
EKG QTC CALCULATION (BAZETT): 411 MS
EKG R AXIS: 32 DEGREES
EKG T AXIS: 23 DEGREES
EKG VENTRICULAR RATE: 86 BPM
GLUCOSE BLD-MCNC: 246 MG/DL (ref 65–105)

## 2022-03-21 PROCEDURE — 93010 ELECTROCARDIOGRAM REPORT: CPT | Performed by: INTERNAL MEDICINE

## 2022-07-27 ENCOUNTER — HOSPITAL ENCOUNTER (OUTPATIENT)
Age: 32
Setting detail: SPECIMEN
Discharge: HOME | End: 2022-07-27

## 2022-07-27 LAB
ABSOLUTE EOS #: 0.08 K/UL (ref 0–0.44)
ABSOLUTE IMMATURE GRANULOCYTE: <0.03 K/UL (ref 0–0.3)
ABSOLUTE LYMPH #: 2.55 K/UL (ref 1.1–3.7)
ABSOLUTE MONO #: 0.4 K/UL (ref 0.1–1.2)
ALBUMIN SERPL-MCNC: 4.5 G/DL (ref 3.5–5.2)
ALBUMIN/GLOBULIN RATIO: 1.3 (ref 1–2.5)
ALP BLD-CCNC: 67 U/L (ref 35–104)
ALT SERPL-CCNC: 12 U/L (ref 5–33)
ANION GAP SERPL CALCULATED.3IONS-SCNC: 14 MMOL/L (ref 9–17)
AST SERPL-CCNC: 12 U/L
BASOPHILS # BLD: 0 % (ref 0–2)
BASOPHILS ABSOLUTE: <0.03 K/UL (ref 0–0.2)
BILIRUB SERPL-MCNC: 0.32 MG/DL (ref 0.3–1.2)
BUN BLDV-MCNC: 7 MG/DL (ref 6–20)
CALCIUM SERPL-MCNC: 9.6 MG/DL (ref 8.6–10.4)
CHLORIDE BLD-SCNC: 104 MMOL/L (ref 98–107)
CHOLESTEROL/HDL RATIO: 4.7
CHOLESTEROL: 178 MG/DL
CO2: 23 MMOL/L (ref 20–31)
CREAT SERPL-MCNC: 0.78 MG/DL (ref 0.5–0.9)
EOSINOPHILS RELATIVE PERCENT: 1 % (ref 1–4)
ESTIMATED AVERAGE GLUCOSE: 134 MG/DL
GFR AFRICAN AMERICAN: >60 ML/MIN
GFR NON-AFRICAN AMERICAN: >60 ML/MIN
GFR SERPL CREATININE-BSD FRML MDRD: ABNORMAL ML/MIN/{1.73_M2}
GLUCOSE BLD-MCNC: 124 MG/DL (ref 70–99)
HBA1C MFR BLD: 6.3 % (ref 4–6)
HCT VFR BLD CALC: 42.6 % (ref 36.3–47.1)
HDLC SERPL-MCNC: 38 MG/DL
HEMOGLOBIN: 13.1 G/DL (ref 11.9–15.1)
IMMATURE GRANULOCYTES: 0 %
LDL CHOLESTEROL: 120 MG/DL (ref 0–130)
LYMPHOCYTES # BLD: 41 % (ref 24–43)
MCH RBC QN AUTO: 29 PG (ref 25.2–33.5)
MCHC RBC AUTO-ENTMCNC: 30.8 G/DL (ref 28.4–34.8)
MCV RBC AUTO: 94.5 FL (ref 82.6–102.9)
MONOCYTES # BLD: 6 % (ref 3–12)
NRBC AUTOMATED: 0 PER 100 WBC
PDW BLD-RTO: 12.7 % (ref 11.8–14.4)
PLATELET # BLD: 267 K/UL (ref 138–453)
PMV BLD AUTO: 11.7 FL (ref 8.1–13.5)
POTASSIUM SERPL-SCNC: 3.8 MMOL/L (ref 3.7–5.3)
PROLACTIN: 102.4 NG/ML (ref 4.79–23.3)
RBC # BLD: 4.51 M/UL (ref 3.95–5.11)
SEG NEUTROPHILS: 52 % (ref 36–65)
SEGMENTED NEUTROPHILS ABSOLUTE COUNT: 3.19 K/UL (ref 1.5–8.1)
SODIUM BLD-SCNC: 141 MMOL/L (ref 135–144)
TOTAL PROTEIN: 8.1 G/DL (ref 6.4–8.3)
TRIGL SERPL-MCNC: 99 MG/DL
TSH SERPL DL<=0.05 MIU/L-ACNC: 0.52 UIU/ML (ref 0.3–5)
WBC # BLD: 6.3 K/UL (ref 3.5–11.3)

## 2024-07-11 ENCOUNTER — HOSPITAL ENCOUNTER (OUTPATIENT)
Age: 34
Setting detail: SPECIMEN
Discharge: HOME | End: 2024-07-11

## 2024-07-11 LAB
BASOPHILS # BLD: 0.04 K/UL (ref 0–0.2)
BASOPHILS NFR BLD: 1 % (ref 0–2)
EOSINOPHIL # BLD: 0.18 K/UL (ref 0–0.44)
EOSINOPHILS RELATIVE PERCENT: 3 % (ref 1–4)
ERYTHROCYTE [DISTWIDTH] IN BLOOD BY AUTOMATED COUNT: 12.8 % (ref 11.8–14.4)
HCT VFR BLD AUTO: 41.7 % (ref 36.3–47.1)
HGB BLD-MCNC: 13.1 G/DL (ref 11.9–15.1)
IMM GRANULOCYTES # BLD AUTO: <0.03 K/UL (ref 0–0.3)
IMM GRANULOCYTES NFR BLD: 0 %
LYMPHOCYTES NFR BLD: 3.58 K/UL (ref 1.1–3.7)
LYMPHOCYTES RELATIVE PERCENT: 59 % (ref 24–43)
MCH RBC QN AUTO: 28.7 PG (ref 25.2–33.5)
MCHC RBC AUTO-ENTMCNC: 31.4 G/DL (ref 28.4–34.8)
MCV RBC AUTO: 91.4 FL (ref 82.6–102.9)
MONOCYTES NFR BLD: 0.38 K/UL (ref 0.1–1.2)
MONOCYTES NFR BLD: 6 % (ref 3–12)
NEUTROPHILS NFR BLD: 31 % (ref 36–65)
NEUTS SEG NFR BLD: 1.9 K/UL (ref 1.5–8.1)
NRBC BLD-RTO: 0 PER 100 WBC
PLATELET # BLD AUTO: 238 K/UL (ref 138–453)
PMV BLD AUTO: 11.6 FL (ref 8.1–13.5)
RBC # BLD AUTO: 4.56 M/UL (ref 3.95–5.11)
WBC OTHER # BLD: 6.1 K/UL (ref 3.5–11.3)

## 2024-07-12 LAB
ALBUMIN SERPL-MCNC: 4.2 G/DL (ref 3.5–5.2)
ALBUMIN/GLOB SERPL: 1 {RATIO} (ref 1–2.5)
ALP SERPL-CCNC: 68 U/L (ref 35–104)
ALT SERPL-CCNC: 17 U/L (ref 10–35)
ANION GAP SERPL CALCULATED.3IONS-SCNC: 14 MMOL/L (ref 9–16)
AST SERPL-CCNC: 17 U/L (ref 10–35)
BILIRUB SERPL-MCNC: 0.3 MG/DL (ref 0–1.2)
BUN SERPL-MCNC: 7 MG/DL (ref 6–20)
CALCIUM SERPL-MCNC: 9 MG/DL (ref 8.6–10.4)
CHLORIDE SERPL-SCNC: 106 MMOL/L (ref 98–107)
CHOLEST SERPL-MCNC: 162 MG/DL (ref 0–199)
CHOLESTEROL/HDL RATIO: 4
CO2 SERPL-SCNC: 23 MMOL/L (ref 20–31)
CREAT SERPL-MCNC: 0.8 MG/DL (ref 0.5–0.9)
EST. AVERAGE GLUCOSE BLD GHB EST-MCNC: 143 MG/DL
GFR, ESTIMATED: >90 ML/MIN/1.73M2
GLUCOSE SERPL-MCNC: 108 MG/DL (ref 74–99)
HBA1C MFR BLD: 6.6 % (ref 4–6)
HDLC SERPL-MCNC: 40 MG/DL
LDLC SERPL CALC-MCNC: 112 MG/DL (ref 0–100)
POTASSIUM SERPL-SCNC: 4 MMOL/L (ref 3.7–5.3)
PROT SERPL-MCNC: 7.5 G/DL (ref 6.6–8.7)
SODIUM SERPL-SCNC: 143 MMOL/L (ref 136–145)
TRIGL SERPL-MCNC: 48 MG/DL
TSH SERPL DL<=0.05 MIU/L-ACNC: 0.71 UIU/ML (ref 0.27–4.2)
VLDLC SERPL CALC-MCNC: 10 MG/DL

## 2024-12-19 ENCOUNTER — HOSPITAL ENCOUNTER (INPATIENT)
Age: 34
LOS: 4 days | Discharge: HOME | DRG: 750 | End: 2024-12-23
Attending: STUDENT IN AN ORGANIZED HEALTH CARE EDUCATION/TRAINING PROGRAM | Admitting: PSYCHIATRY & NEUROLOGY
Payer: COMMERCIAL

## 2024-12-19 DIAGNOSIS — F30.10 MANIC BEHAVIOR (HCC): Primary | ICD-10-CM

## 2024-12-19 PROBLEM — F23 ACUTE PSYCHOSIS (HCC): Status: ACTIVE | Noted: 2024-12-19

## 2024-12-19 LAB
ALBUMIN SERPL-MCNC: 4.8 G/DL (ref 3.5–5.2)
ALP SERPL-CCNC: 79 U/L (ref 35–104)
ALT SERPL-CCNC: 32 U/L (ref 10–35)
ANION GAP SERPL CALCULATED.3IONS-SCNC: 19 MMOL/L (ref 9–16)
AST SERPL-CCNC: 44 U/L (ref 10–35)
BILIRUB SERPL-MCNC: 0.7 MG/DL (ref 0–1.2)
BUN SERPL-MCNC: 7 MG/DL (ref 6–20)
CALCIUM SERPL-MCNC: 9.9 MG/DL (ref 8.6–10.4)
CHLORIDE SERPL-SCNC: 105 MMOL/L (ref 98–107)
CO2 SERPL-SCNC: 19 MMOL/L (ref 20–31)
CREAT SERPL-MCNC: 0.8 MG/DL (ref 0.7–1.2)
ETHANOL PERCENT: 0 %
ETHANOLAMINE SERPL-MCNC: <10 MG/DL (ref 0–0.08)
GFR, ESTIMATED: >90 ML/MIN/1.73M2
GLUCOSE SERPL-MCNC: 147 MG/DL (ref 74–99)
POTASSIUM SERPL-SCNC: 3.3 MMOL/L (ref 3.7–5.3)
PROT SERPL-MCNC: 8.5 G/DL (ref 6.6–8.7)
SODIUM SERPL-SCNC: 143 MMOL/L (ref 136–145)

## 2024-12-19 PROCEDURE — 1240000000 HC EMOTIONAL WELLNESS R&B

## 2024-12-19 PROCEDURE — 6370000000 HC RX 637 (ALT 250 FOR IP): Performed by: STUDENT IN AN ORGANIZED HEALTH CARE EDUCATION/TRAINING PROGRAM

## 2024-12-19 PROCEDURE — 36415 COLL VENOUS BLD VENIPUNCTURE: CPT

## 2024-12-19 PROCEDURE — 6370000000 HC RX 637 (ALT 250 FOR IP): Performed by: NURSE PRACTITIONER

## 2024-12-19 PROCEDURE — G0480 DRUG TEST DEF 1-7 CLASSES: HCPCS

## 2024-12-19 PROCEDURE — 99285 EMERGENCY DEPT VISIT HI MDM: CPT

## 2024-12-19 PROCEDURE — 80053 COMPREHEN METABOLIC PANEL: CPT

## 2024-12-19 RX ORDER — POLYETHYLENE GLYCOL 3350 17 G
2 POWDER IN PACKET (EA) ORAL
Status: DISCONTINUED | OUTPATIENT
Start: 2024-12-19 | End: 2024-12-23 | Stop reason: HOSPADM

## 2024-12-19 RX ORDER — LORAZEPAM 1 MG/1
2 TABLET ORAL EVERY 6 HOURS PRN
Status: DISCONTINUED | OUTPATIENT
Start: 2024-12-19 | End: 2024-12-23 | Stop reason: HOSPADM

## 2024-12-19 RX ORDER — HYDROXYZINE HYDROCHLORIDE 50 MG/1
50 TABLET, FILM COATED ORAL 3 TIMES DAILY PRN
Status: DISCONTINUED | OUTPATIENT
Start: 2024-12-19 | End: 2024-12-23 | Stop reason: HOSPADM

## 2024-12-19 RX ORDER — HALOPERIDOL 5 MG/ML
5 INJECTION INTRAMUSCULAR EVERY 6 HOURS PRN
Status: DISCONTINUED | OUTPATIENT
Start: 2024-12-19 | End: 2024-12-23 | Stop reason: HOSPADM

## 2024-12-19 RX ORDER — DIPHENHYDRAMINE HYDROCHLORIDE 50 MG/ML
50 INJECTION INTRAMUSCULAR; INTRAVENOUS EVERY 6 HOURS PRN
Status: DISCONTINUED | OUTPATIENT
Start: 2024-12-19 | End: 2024-12-23 | Stop reason: HOSPADM

## 2024-12-19 RX ORDER — POLYETHYLENE GLYCOL 3350 17 G/17G
17 POWDER, FOR SOLUTION ORAL DAILY PRN
Status: DISCONTINUED | OUTPATIENT
Start: 2024-12-19 | End: 2024-12-23 | Stop reason: HOSPADM

## 2024-12-19 RX ORDER — IBUPROFEN 400 MG/1
400 TABLET, FILM COATED ORAL EVERY 6 HOURS PRN
Status: DISCONTINUED | OUTPATIENT
Start: 2024-12-19 | End: 2024-12-23 | Stop reason: HOSPADM

## 2024-12-19 RX ORDER — LORAZEPAM 2 MG/ML
2 INJECTION INTRAMUSCULAR EVERY 6 HOURS PRN
Status: DISCONTINUED | OUTPATIENT
Start: 2024-12-19 | End: 2024-12-23 | Stop reason: HOSPADM

## 2024-12-19 RX ORDER — HALOPERIDOL 5 MG/1
5 TABLET ORAL EVERY 6 HOURS PRN
Status: DISCONTINUED | OUTPATIENT
Start: 2024-12-19 | End: 2024-12-23 | Stop reason: HOSPADM

## 2024-12-19 RX ORDER — ACETAMINOPHEN 325 MG/1
650 TABLET ORAL EVERY 4 HOURS PRN
Status: DISCONTINUED | OUTPATIENT
Start: 2024-12-19 | End: 2024-12-23 | Stop reason: HOSPADM

## 2024-12-19 RX ORDER — LORAZEPAM 1 MG/1
2 TABLET ORAL ONCE
Status: COMPLETED | OUTPATIENT
Start: 2024-12-19 | End: 2024-12-19

## 2024-12-19 RX ORDER — MAGNESIUM HYDROXIDE/ALUMINUM HYDROXICE/SIMETHICONE 120; 1200; 1200 MG/30ML; MG/30ML; MG/30ML
30 SUSPENSION ORAL EVERY 6 HOURS PRN
Status: DISCONTINUED | OUTPATIENT
Start: 2024-12-19 | End: 2024-12-23 | Stop reason: HOSPADM

## 2024-12-19 RX ADMIN — Medication 3 MG: at 20:56

## 2024-12-19 RX ADMIN — POTASSIUM BICARBONATE 40 MEQ: 782 TABLET, EFFERVESCENT ORAL at 16:48

## 2024-12-19 RX ADMIN — HALOPERIDOL 5 MG: 5 TABLET ORAL at 20:17

## 2024-12-19 RX ADMIN — LORAZEPAM 2 MG: 1 TABLET ORAL at 14:24

## 2024-12-19 RX ADMIN — LORAZEPAM 2 MG: 1 TABLET ORAL at 20:17

## 2024-12-19 ASSESSMENT — PATIENT HEALTH QUESTIONNAIRE - PHQ9
SUM OF ALL RESPONSES TO PHQ9 QUESTIONS 1 & 2: 2
2. FEELING DOWN, DEPRESSED OR HOPELESS: SEVERAL DAYS
SUM OF ALL RESPONSES TO PHQ QUESTIONS 1-9: 2
1. LITTLE INTEREST OR PLEASURE IN DOING THINGS: SEVERAL DAYS

## 2024-12-19 ASSESSMENT — LIFESTYLE VARIABLES
HOW OFTEN DO YOU HAVE A DRINK CONTAINING ALCOHOL: NEVER
HOW OFTEN DO YOU HAVE A DRINK CONTAINING ALCOHOL: NEVER
HOW MANY STANDARD DRINKS CONTAINING ALCOHOL DO YOU HAVE ON A TYPICAL DAY: PATIENT DOES NOT DRINK
HOW MANY STANDARD DRINKS CONTAINING ALCOHOL DO YOU HAVE ON A TYPICAL DAY: PATIENT DOES NOT DRINK

## 2024-12-19 ASSESSMENT — SLEEP AND FATIGUE QUESTIONNAIRES
AVERAGE NUMBER OF SLEEP HOURS: 8
DO YOU HAVE DIFFICULTY SLEEPING: NO
DO YOU USE A SLEEP AID: NO

## 2024-12-19 NOTE — BH NOTE
Patient was offered to get numbers out of phone, was attempting to use social media such as [facility]. Patient was redirected and put the phone down. Patient unable to obtain numbers due to mental status at time of admission.

## 2024-12-19 NOTE — ED NOTES
Report given to LOREN Riddle from [facility].   Report method by phone   The following was reviewed with receiving RN:   Current vital signs:  /75   Pulse (!) 110   Temp 99.1 °F (37.3 °C) (Oral)   Resp 16   Ht 1.6 m (5' 2.99\")   Wt 132.9 kg (293 lb)   SpO2 100%   BMI 51.92 kg/m²                      Any medication or safety alerts were reviewed. Any pending diagnostics and notifications were also reviewed, as well as any safety concerns or issues, abnormal labs, abnormal imaging, and abnormal assessment findings. Questions were answered.

## 2024-12-19 NOTE — ED NOTES
[facility]     Hospitalist Progress Note     Momo Winter Patient Status:  Inpatient    1980 MRN [id number]   [facility] 4SW-A Attending Evin Hope,    Hosp Day # 3 PCP Leo Woods Provisional Diagnosis:   Acute psychosis     Psychosocial and Contextual Factors:   Patient reporting worsening anxiety. Patient has significant thought blocking at times, and is responding to internal stimuli, often preoccupied.     Patient: X  Family:   Agency:     Substance Abuse: Patient denies.    Present Suicidal Behavior:  Patient denies.    Verbal:     Attempt:    Past Suicidal Behavior: Patient denies.    Verbal:    Attempt:      Self-Injurious/Self-Mutilation:Patient denies.       Violence Current or Past: Patient is calm and cooperative.      Trauma Identified:  Patient denies.    Protective Factors:    Patient states she has housing.      Risk Factors:    Patient has poor insight.      Clinical Summary:    Sabrina Candelaria is a 34 y.o. female who presents to the ED for a mental health evaluation stating she has been feeling \"worried and anxious.\" During the course of assessment patient is actively paranoid and responding to internal stimuli.   Patient appears preoccupied and responding to internal stimuli. Patient appears to be responding to internal stimuli. Patient having difficulty concentrating on writers questions and has thought blocking.  Patient will occasionally not answer questions approprietly and states \"I'll let you know later.\" Patient will occasionally stare off, then look over her shoulder in a paranoid manner.  At one point in the ED patient began stating she is hearing \"people up there, and they are telling me not to get up.\" Patient at other times responding to internal stimuli having a conversation with things that are not there.       Patient appears preoccupied during most of the assessment due to auditory/visual hallucinations.   Patient is wandering around the [facility]PU while in the ED. Patient tells writer she believes it is December 12th, 1998. Patient then later told ED physician she believes the year was 2008.     Patient not able to give writer a detailed history and is vague with  \"

## 2024-12-19 NOTE — BH NOTE
Patient signed paperwork but was signing a name other than her own. Only valid document is voluntary from emergency room. Will attempt to have patient sign paperwork when patient can verbalize understanding.

## 2024-12-19 NOTE — ED PROVIDER NOTES
[facility]  Emergency Department Encounter  Emergency Medicine Physician     Pt Name: Sabrina Candelaria  MRN: [id number]  Birthdate 1990  Date of evaluation: 24  PCP:  Jhoan Loyola MD    CHIEF COMPLAINT       Chief Complaint   Patient presents with    Anxiety       HISTORY OF PRESENT ILLNESS  (Location/Symptom, Timing/Onset, Context/Setting, Quality, Duration, Modifying Factors, Severity.)    Sabrina Candelaria is a 34 y.o. female who presents with originally complained of anxiety.  However patient having significant thought blocking at times.  She denies any auditory hallucinations or visual hallucinations however.  States he is feeling anxious, ongoing mostly for today but also yesterday.        PAST MEDICAL / SURGICAL / SOCIAL / FAMILY HISTORY    has a past medical history of Bipolar disorder (HCC), Depression, Obesity, and Type 2 diabetes mellitus without complication, without long-term current use of insulin (HCC).     has a past surgical history that includes Tonsillectomy and  section.    Social History     Socioeconomic History    Marital status: Single     Spouse name: Not on file    Number of children: Not on file    Years of education: Not on file    Highest education level: Not on file   Occupational History    Not on file   Tobacco Use    Smoking status: Some Days     Current packs/day: 0.00     Average packs/day: 0.1 packs/day for 1 year (0.1 ttl pk-yrs)     Types: Cigarettes     Start date: 2011     Last attempt to quit: 2012     Years since quittin.0    Smokeless tobacco: Never    Tobacco comments:     Black and milds    Substance and Sexual Activity    Alcohol use: No     Alcohol/week: 0.0 standard drinks of alcohol    Drug use: No    Sexual activity: Not Currently     Partners: Male   Other Topics Concern    Not on file   Social History Narrative    Stay at home, takes care of child. Lives with mother.      Social Determinants of Health

## 2024-12-19 NOTE — BH NOTE
Patient denied being a tobacco/nicotine user at this time.       Patient does not use tobacco products. No interventions needed or discussed at this time.

## 2024-12-19 NOTE — BH NOTE
[facility]  Admission Note     Admission Type:   Admission Type: Voluntary    Reason for admission:  Reason for Admission: Patient came to visit parent on another unit and was found to be wondering around looking for her parent. Was brought down to ER for evaluation started to become anxious, paranoid, and responding to internal stimuli. While in ER was unable to sit still extremely restless and pacing around was able to be redirected but would make bizzare comments and when asked to clarify was unable with extreme thought block.      Addictive Behavior:   Addictive Behavior  In the Past 3 Months, Have You Felt or Has Someone Told You That You Have a Problem With  : None    Medical Problems:   Past Medical History:   Diagnosis Date    Bipolar disorder ([facility])     Depression 07/15/2013    Obesity     Type 2 diabetes mellitus without complication, without long-term current use of insulin ([facility])        Status EXAM:  Mental Status and Behavioral Exam  Normal: No  Level of Assistance: Independent/Self  Facial Expression: Flat  Affect: Blunt  Level of Consciousness: Alert  Frequency of Checks: 4 times per hour, close  Mood:Normal: No  Mood: Anxious, Helpless  Motor Activity:Normal: No  Motor Activity: Increased  Eye Contact: Fair  Observed Behavior: Withdrawn, Impulsive, Preoccupied  Sexual Misconduct History: Current - no  Preception: [place] to person (disoriented to time, place, and situation. Oriented to person at times signed paperwork as another person.)  Attention:Normal: No  Attention: Unable to concentrate  Thought Processes: Blocking  Thought Content:Normal: No  Thought Content: Paranoia, Preoccupations  Depression Symptoms: Feelings of helplessness, Feelings of hopelessess  Anxiety Symptoms: Generalized  Mag Symptoms: Poor judgment, Labile  Hallucinations: Auditory (comment) (responding to internal stimuli)  Delusions: Yes  Delusions: Paranoid  Memory:Normal: No  Memory: Poor recent, Poor

## 2024-12-19 NOTE — BH NOTE
Pt admitted to unit CD [facility] at 1718 RM #[id number] per provider order. Pt scanned with metal detector. Nourishment provided. Provider paged for orders. Will monitor pt for safety and behavior.

## 2024-12-20 PROBLEM — F30.10 MANIC BEHAVIOR (HCC): Status: ACTIVE | Noted: 2024-12-20

## 2024-12-20 LAB
AMPHET UR QL SCN: NEGATIVE
BARBITURATES UR QL SCN: NEGATIVE
BENZODIAZ UR QL: POSITIVE
CANNABINOIDS UR QL SCN: POSITIVE
COCAINE UR QL SCN: NEGATIVE
FENTANYL UR QL: NEGATIVE
GLUCOSE BLD-MCNC: 144 MG/DL (ref 65–105)
HCG UR QL: NEGATIVE
METHADONE UR QL: NEGATIVE
OPIATES UR QL SCN: NEGATIVE
OXYCODONE UR QL SCN: NEGATIVE
PCP UR QL SCN: NEGATIVE
TEST INFORMATION: ABNORMAL

## 2024-12-20 PROCEDURE — 99222 1ST HOSP IP/OBS MODERATE 55: CPT | Performed by: INTERNAL MEDICINE

## 2024-12-20 PROCEDURE — 80307 DRUG TEST PRSMV CHEM ANLYZR: CPT

## 2024-12-20 PROCEDURE — APPSS60 APP SPLIT SHARED TIME 46-60 MINUTES

## 2024-12-20 PROCEDURE — 1240000000 HC EMOTIONAL WELLNESS R&B

## 2024-12-20 PROCEDURE — 2500000003 HC RX 250 WO HCPCS: Performed by: INTERNAL MEDICINE

## 2024-12-20 PROCEDURE — 6370000000 HC RX 637 (ALT 250 FOR IP): Performed by: PSYCHIATRY & NEUROLOGY

## 2024-12-20 PROCEDURE — 81025 URINE PREGNANCY TEST: CPT

## 2024-12-20 PROCEDURE — 82947 ASSAY GLUCOSE BLOOD QUANT: CPT

## 2024-12-20 PROCEDURE — 99223 1ST HOSP IP/OBS HIGH 75: CPT | Performed by: PSYCHIATRY & NEUROLOGY

## 2024-12-20 PROCEDURE — 6370000000 HC RX 637 (ALT 250 FOR IP): Performed by: INTERNAL MEDICINE

## 2024-12-20 PROCEDURE — 6370000000 HC RX 637 (ALT 250 FOR IP): Performed by: NURSE PRACTITIONER

## 2024-12-20 PROCEDURE — 81001 URINALYSIS AUTO W/SCOPE: CPT

## 2024-12-20 RX ORDER — RISPERIDONE 1 MG/1
1 TABLET ORAL 2 TIMES DAILY
Status: DISCONTINUED | OUTPATIENT
Start: 2024-12-20 | End: 2024-12-23 | Stop reason: HOSPADM

## 2024-12-20 RX ORDER — POTASSIUM CHLORIDE 1500 MG/1
40 TABLET, EXTENDED RELEASE ORAL ONCE
Status: COMPLETED | OUTPATIENT
Start: 2024-12-20 | End: 2024-12-20

## 2024-12-20 RX ADMIN — ANTI-FUNGAL POWDER MICONAZOLE NITRATE TALC FREE: 1.42 POWDER TOPICAL at 21:44

## 2024-12-20 RX ADMIN — RISPERIDONE 1 MG: 1 TABLET, FILM COATED ORAL at 23:50

## 2024-12-20 RX ADMIN — ANTI-FUNGAL POWDER MICONAZOLE NITRATE TALC FREE: 1.42 POWDER TOPICAL at 08:36

## 2024-12-20 RX ADMIN — Medication 3 MG: at 21:43

## 2024-12-20 RX ADMIN — HYDROXYZINE HYDROCHLORIDE 50 MG: 50 TABLET ORAL at 21:42

## 2024-12-20 RX ADMIN — POTASSIUM CHLORIDE 40 MEQ: 1500 TABLET, EXTENDED RELEASE ORAL at 16:08

## 2024-12-20 ASSESSMENT — LIFESTYLE VARIABLES
HOW MANY STANDARD DRINKS CONTAINING ALCOHOL DO YOU HAVE ON A TYPICAL DAY: PATIENT DOES NOT DRINK
HOW OFTEN DO YOU HAVE A DRINK CONTAINING ALCOHOL: NEVER
HOW OFTEN DO YOU HAVE A DRINK CONTAINING ALCOHOL: NEVER
HOW MANY STANDARD DRINKS CONTAINING ALCOHOL DO YOU HAVE ON A TYPICAL DAY: PATIENT DOES NOT DRINK

## 2024-12-20 NOTE — H&P
Department of Psychiatry  Attending Physician Psychiatric Assessment   Patient: Sabrina Candelaria  MRN: [id number]  Reason for Admission to Psychiatric Unit:  Acute disordered/bizarre behavior or psychomotor agitation or retardation;interferes with ADLs so that patient cannot function at a less intensive care level of care during evaluation and treatment   Cognitive impairment (disorientation or memory loss) due to a mental disorder excluding personality disorders or mental retardation (i.e. Axis I disorder);endangers welfare of patient or others   A mental disorder causing major disability in social, interpersonal, occupational, and/or educational functioning that is leading to dangerous or life-threatening functioning, and that can only be addressed in an acute inpatient setting   A mental disorder that causes an inability to maintain adequate nurtrition or self-care, and family/community support cannot provide reliable, essential care, so that the patient cannont function at a less intensive level of care during evaluation and treatment   Failure of outpatient psychiatry treatment so that the beneficiary requires 24 hour professional observation and care  Concerns about patient's safety in the community  Past Mental Health Diagnosis: a history of  Major Depression  Triggering event or precipitating factor: Housing instability, Financial instability, and Psych Treatment Noncompliance  Length of time/duration of triggering event: Days  Legal Status: Involuntary    CHIEF COMPLAINT: Acute psychosis    History obtained from: Patient, electronic medical record          HISTORY OF PRESENT ILLNESS:    Sabrina Candelaria is a 34 y.o. female who has a past medical history of depression, bipolar disorder, multiple STDs. Patient presented to the ED for a mental health evaluation stating she has been feeling \"worried and anxious.\" During the course of assessment patient is actively paranoid and responding to internal stimuli. 
MEDICINE      Fabrizio Keita MD  12/20/2024  2:06 PM    Copy sent to Jhoan Kumar MD    Please note that this chart was generated using voice recognition Dragon dictation software.  Although every effort was made to ensure the accuracy of this automated transcription, some errors in transcription may have occurred.

## 2024-12-20 NOTE — BH NOTE
Emergency PRN Medication Administration Note:      Patient is Agitated as evidence by patient attempting to go outdoors, pulling handles, talking about leaving, wandering to other patients rooms. Staff attempted to find and relieve the distress by Talking to patient, Refocusing on new activity, and Administer PRN medications Patient is currently , accepted PRN medications. Medication Administered as prescribed: (Haldol 5 mg oral tablet and ativan 2 mg oral tablet. Patient Tolerated medication administration.   Will continue to monitor, offer support, and reassess.

## 2024-12-20 NOTE — PROGRESS NOTES

## 2024-12-20 NOTE — GROUP NOTE
Group Therapy Note    Date: 12/20/2024    Group Start Time: 0900  Group End Time: 0930  Group Topic: Nursing    Iman Lynne LPN        Group Therapy Note    Attendees: 10/21       patient refused to attend goals group at 0900 after encouragement from staff.  1:1 talk time provided as alternative to group session      Signature:  Iman Aceves LPN

## 2024-12-20 NOTE — GROUP NOTE
Group Therapy Note    Date: 12/20/2024    Group Start Time: 1435  Group End Time: 1535  Group Topic: Recreational    STCZ ANTHONYI Yolanda Torres CTRS        Group Therapy Note    Attendees: 11/21     Patient's Goal: To increase socialization, practice leisure and communication skills, and relate to peers about interests.         Notes: Pt was pleasant and in behavioral control. Pt was seclusive to self and engaged in creative expression at a table by herself during group.       Status After Intervention:  Improved     Participation Level: Active Listener,  focused quite well on task, seclusive to self     Participation Quality:  Attentive, focused quite well on task, seclusive to self    Speech: Minimal, pt glances to her left before responding to question/comment from RT     Thought Process/Content: Logical, linear r/t task      Affective Functioning: Blunted     Mood: Seclusive to self, in behavioral control      Level of consciousness:  Alert, and Attentive      Response to Learning:  Attentive to familiar task, and Progressing to goal      Endings: None Reported     Modes of Intervention: Education, Support, Socialization, Exploration, Clarifying and Problem-solving      Discipline Responsible: Psychoeducational Specialist      Signature:  MARJORIE LIZARRAGA

## 2024-12-20 NOTE — CARE COORDINATION
[facility] Biopsychosocial Assessment    Current Level of Psychosocial Functioning     Independent   Dependent  X  Minimal Assist     Psychosocial High Risk Factors (check all that apply)    Unable to obtain meds   Chronic illness/pain    Substance abuse   Lack of Family Support X  Financial stress   Isolation X  Inadequate Community Resources  Suicide attempt(s)  Not taking medications X  Victim of crime   Developmental Delay  Unable to manage personal needs  X  Age 65 or older   Homeless  No transportation   Readmission within 30 days  Unemployment  Traumatic Event    Psychiatric Advanced Directives: none reported     Family to Involve in Treatment: Lack of family support     Sexual Orientation:  VINH     Patient Strengths: insurance, linked with [facility] for outpatient Treatment     Patient Barriers: presenting with acute Psychosis, non-compliant with outpatient treatment.     Opiate Education Provided: N/A Patient denies any current Heroin or Opiate use/abuse. Patient denies any Illicit drug use. There was not a drug screen completed upon admission.     CMHC/mental health history: Linked with [facility], stable housing in [place]     Plan of Care   medication management, group/individual therapies, family meetings, psycho -education, treatment team meetings to assist with stabilization    Initial Discharge Plan:  Patient reports a plan to return home and follow up with outpatient treatment at [facility] upon discharge.       Clinical Summary:  Sabrina Candelaria is a 34 y.o. female who presents on admission with Acute Psychosis. It was documented in ED note that patient reported an increase in symptoms of Anxiety. Patient appears preoccupied and responding to internal stimuli during assessment   Patient having difficulty concentrating on writers questions and has thought blocking.       Patient not able to give writer a detailed history and is vague with her answers. Patient denies past mental health history, but per EMR has a past

## 2024-12-20 NOTE — PROGRESS NOTES
Pharmacy Medication History Note    List of current medications patient is taking is complete.    Source of information: [facility] ([phone]), [facility] ([phone]), Epic, PDMP, [employer] dispense report    Changes made to medication list:  Medications removed (include reason, ex. therapy complete or physician discontinued, noncompliance):  Citalopram (list clean up), Metformin (list clean up), Invega sustenna (list clean up), Ondansetron (list clean up), Ibuprofen (list clean up), Risperidone (list clean up)    Medications flagged for provider review:  none    Medications added/doses adjusted:  none    Other notes (ex. Recent course of antibiotics, Coumadin dosing):  No recent fill history found for patient using sources listed above.      Please let me know if you have any questions about this encounter. Thank you!    Electronically signed by Louise Guan RPH on 12/20/2024 at 10:24 AM

## 2024-12-20 NOTE — PROGRESS NOTES
Behavioral Services  Medicare Certification Upon Admission    I certify that this patient's inpatient psychiatric hospital admission is medically necessary for:    [x] (1) Treatment which could reasonably be expected to improve this patient's condition,       [x] (2) Or for diagnostic study;     AND     [x](2) The inpatient psychiatric services are provided while the individual is under the care of a physician and are included in the individualized plan of care.    Estimated length of stay/service 2-9 days    Plan for post-hospital care -outpatient care    Electronically signed by NAY PEDRO MD on 12/20/2024 at 9:24 AM

## 2024-12-20 NOTE — BH NOTE
Lab requesting to push back STAT orders to tomorrow morning for CBC HCG qualitative serum. NP stated that she was fine with that.

## 2024-12-20 NOTE — BH NOTE
Emergency Medication Follow-Up Note:    PRN medication of haldol 5 mg PO and ativan 2 mg PO was effective as evidence by Patient regain behavioral control and absence of behavior warranting emergency medication.. Patient denies medication side effects. Will continue to monitor and provide support as needed.

## 2024-12-20 NOTE — GROUP NOTE
Group Therapy Note    Date: 12/20/2024    Group Start Time: 1100  Group End Time: 1145  Group Topic: Cognitive Skills    Yolanda Cam CTRS        Group Therapy Note    Attendees: 14/21     Topic: : To increase socialization, practice decision making, communication skills,   and explore perception.          Comments:   Patient attended group but did not participate in Cognitive Skills group, at 11:00, despite staff encouragement.   Pt was seclusive to self and sat on periphery of group and colored.     Q15 minute safety checks maintained for patient safety and will continue to encourage   patient to attend unit programming.       Discipline Responsible: Psychoeducational Specialist   Signature: MARJORIE LIZARRAGA

## 2024-12-21 LAB
BACTERIA URNS QL MICRO: ABNORMAL
BILIRUB UR QL STRIP: NEGATIVE
CASTS #/AREA URNS LPF: ABNORMAL /LPF
CLARITY UR: ABNORMAL
COLOR UR: YELLOW
EPI CELLS #/AREA URNS HPF: ABNORMAL /HPF
GLUCOSE UR STRIP-MCNC: NEGATIVE MG/DL
HGB UR QL STRIP.AUTO: NEGATIVE
KETONES UR STRIP-MCNC: ABNORMAL MG/DL
LEUKOCYTE ESTERASE UR QL STRIP: NEGATIVE
NITRITE UR QL STRIP: NEGATIVE
PH UR STRIP: 7 [PH] (ref 5–8)
PROT UR STRIP-MCNC: NEGATIVE MG/DL
RBC #/AREA URNS HPF: ABNORMAL /HPF
SP GR UR STRIP: 1.02 (ref 1–1.03)
UROBILINOGEN UR STRIP-ACNC: NORMAL EU/DL (ref 0–1)
WBC #/AREA URNS HPF: ABNORMAL /HPF

## 2024-12-21 PROCEDURE — 6360000002 HC RX W HCPCS: Performed by: PSYCHIATRY & NEUROLOGY

## 2024-12-21 PROCEDURE — 1240000000 HC EMOTIONAL WELLNESS R&B

## 2024-12-21 PROCEDURE — 99232 SBSQ HOSP IP/OBS MODERATE 35: CPT | Performed by: PSYCHIATRY & NEUROLOGY

## 2024-12-21 PROCEDURE — 6370000000 HC RX 637 (ALT 250 FOR IP): Performed by: NURSE PRACTITIONER

## 2024-12-21 PROCEDURE — 99232 SBSQ HOSP IP/OBS MODERATE 35: CPT | Performed by: INTERNAL MEDICINE

## 2024-12-21 PROCEDURE — 6370000000 HC RX 637 (ALT 250 FOR IP): Performed by: PSYCHIATRY & NEUROLOGY

## 2024-12-21 RX ADMIN — Medication 3 MG: at 21:34

## 2024-12-21 RX ADMIN — RISPERIDONE 1 MG: 1 TABLET, FILM COATED ORAL at 07:50

## 2024-12-21 RX ADMIN — ANTI-FUNGAL POWDER MICONAZOLE NITRATE TALC FREE: 1.42 POWDER TOPICAL at 21:35

## 2024-12-21 RX ADMIN — RISPERIDONE 200 MG: 200 INJECTION, SUSPENSION, EXTENDED RELEASE SUBCUTANEOUS at 14:33

## 2024-12-21 RX ADMIN — RISPERIDONE 1 MG: 1 TABLET, FILM COATED ORAL at 21:34

## 2024-12-21 ASSESSMENT — LIFESTYLE VARIABLES
HOW OFTEN DO YOU HAVE A DRINK CONTAINING ALCOHOL: NEVER
HOW OFTEN DO YOU HAVE A DRINK CONTAINING ALCOHOL: NEVER
HOW MANY STANDARD DRINKS CONTAINING ALCOHOL DO YOU HAVE ON A TYPICAL DAY: PATIENT DOES NOT DRINK

## 2024-12-21 NOTE — PROGRESS NOTES
BEHAVIORAL HEALTH FOLLOW-UP NOTE     2024     Patient was seen and examined in person, Chart reviewed   Patient's case discussed with staff/team    Chief Complaint: psychosis.     Interim History:     The patient has been taking her risperidone.  She has found it beneficial.  Her insight is partial and she believes it is helping her anxiety.  She denies any paranoia currently.  She is evasive and guarded.  She has no side effects from her medication.  We discussed that she would benefit from a long-acting injection and she was agreeable.    BP (!) 160/98   Pulse 82   Temp 97.2 °F (36.2 °C) (Temporal)   Resp 14   Ht 1.575 m (5' 2\")   Wt 132.9 kg (293 lb)   SpO2 100%   BMI 53.59 kg/m²   Appetite:   [x] Normal/Unchanged  [] Increased  [] Decreased      Sleep:       [] Normal/Unchanged  [x] Fair       [] Poor              Energy:    [] Normal/Unchanged  [x] Increased  [] Decreased        Aggression:  [] yes  [x] no    Patient is [x] able  [] unable to CONTRACT FOR SAFETY ON THE UNIT    PAST MEDICAL/PSYCHIATRIC HISTORY:   Past Medical History:   Diagnosis Date    Bipolar disorder (HCC)     Depression 07/15/2013    Obesity     Type 2 diabetes mellitus without complication, without long-term current use of insulin (HCC)        FAMILY/SOCIAL HISTORY:  Family History   Problem Relation Age of Onset    Cancer Maternal Grandmother         skin cancer    Breast Cancer Neg Hx     Colon Cancer Neg Hx     Diabetes Neg Hx     Eclampsia Neg Hx     Hypertension Neg Hx     Ovarian Cancer Neg Hx      Labor Neg Hx     Spont Abortions Neg Hx     Stroke Neg Hx      Social History     Socioeconomic History    Marital status: Single     Spouse name: Not on file    Number of children: Not on file    Years of education: Not on file    Highest education level: Not on file   Occupational History    Not on file   Tobacco Use    Smoking status: Some Days     Current packs/day: 0.00     Average packs/day: 0.1 packs/day for 1

## 2024-12-21 NOTE — PROGRESS NOTES
[facility] Internal Medicine  Fabrizio Keita MD; Jhoan Loyola MD, Merlin Granados MD, Kelli Kirkland MD, Ovi Dubose MD; Jennifer Martinez MD    [facility] Internal Medicine   IN-PATIENT SERVICE   [facility]     HISTORY AND PHYSICAL EXAMINATION            Date:   2024  Patient name:  Sabrina Candelaria  Date of admission:  2024  1:42 PM  MRN:   [id number]  Account:  [id number]  YOB: 1990  PCP:    Jhoan Loyola MD  Room:   [address] [place] [address]  Code Status:    Full Code      Chief Complaint:     Diabetes mellitus hypokalemia    History Obtained From:     Patient/EMR/bedside RN     History of Present Illness:     34-year-old -American lady with history of diabetes mellitus recent A1c is 6.6  Patient noted to have hypokalemia potassium 3.3 patient denies any nausea vomiting diarrhea no cathartic or diuretic abuse no paresthesia    Past Medical History:     Past Medical History:   Diagnosis Date    Bipolar disorder ([facility])     Depression 07/15/2013    Obesity     Type 2 diabetes mellitus without complication, without long-term current use of insulin ([facility])         Past Surgical History:     Past Surgical History:   Procedure Laterality Date     SECTION      2011    TONSILLECTOMY      at age 12        Medications Prior to Admission:     Prior to Admission medications    Not on File        Allergies:     Patient has no known allergies.    Social History:     Tobacco:    reports that she has been smoking cigarettes. She started smoking about 13 years ago. She has a 0.1 pack-year smoking history. She has never used smokeless tobacco.  Alcohol:      reports no history of alcohol use.  Drug Use:  reports no history of drug use.    Family History:     Family History   Problem Relation Age of Onset    Cancer Maternal Grandmother         skin cancer    Breast Cancer Neg Hx     Colon Cancer Neg Hx     Diabetes Neg Hx     Eclampsia Neg Hx

## 2024-12-21 NOTE — GROUP NOTE
Group Therapy Note    Date: 12/21/2024    Group Start Time: 0900  Group End Time: 0915  Group Topic: Community Meeting    Jacinta Strong    Community Meeting Group Note        Date: 12/21/2024 Start Time: 9am  End Time: 0915      Number of Participants in Group & Unit Census:  8/22    Goal of Group:To discuss daily goals of the group      Comments:     Patient did not participate in Community Meeting group, despite staff encouragement and explanation of benefits.  Patient remain seclusive to self.  Q15 minute safety checks maintained for patient safety and will continue to encourage patient to attend unit programming.            Signature:  Jacinta Marin

## 2024-12-21 NOTE — GROUP NOTE
Group Therapy Note    Date: 12/21/2024    Group Start Time: 1030  Group End Time: 1125  Group Topic: Psychoeducation    [facility] BHMarisol De Los Santos LSW        Group Therapy Note    Attendees: 12/22       Patient's Goal:  mindful coping     Notes:      Status After Intervention:  Unchanged    Participation Level: Minimal    Participation Quality: Lethargic      Speech:  mute      Thought Process/Content: Logical      Affective Functioning: Blunted      Mood: depressed      Level of consciousness:  Alert      Response to Learning: Progressing to goal      Endings: None Reported    Modes of Intervention: Education and Support      Discipline Responsible: /Counselor      Signature:  AUNG Rene

## 2024-12-21 NOTE — GROUP NOTE
Group Therapy Note    Date: 12/21/2024    Group Start Time: 1345  Group End Time: 1430  Group Topic: Cognitive Skills    [facility] Nicolette Baig CTRS        Group Therapy Note    Attendees: 12/21       Patient's Goal:  pt will demonstrate improved coping skills and improved interpersonal relationships      Status After Intervention:  Improved    Participation Level: Active Listener    Participation Quality: minimal    Speech: mumbles      Thought Process/Content:slow to process      Affective Functioning: flat      Mood: depressed      Level of consciousness:  Alert      Response to Learning: Able to verbalize current knowledge/experience, Capable of insight, and Progressing to goal      Endings: None Reported    Modes of Intervention: Education, Support, and Activity      Discipline Responsible: Psychoeducational Specialist      Signature:  MARJORIE REAVES

## 2024-12-22 LAB
ERYTHROCYTE [DISTWIDTH] IN BLOOD BY AUTOMATED COUNT: 12.9 % (ref 11.5–14.9)
HCG SERPL QL: NEGATIVE
HCT VFR BLD AUTO: 42.6 % (ref 36–46)
HGB BLD-MCNC: 13.7 G/DL (ref 12–16)
MCH RBC QN AUTO: 29 PG (ref 26–34)
MCHC RBC AUTO-ENTMCNC: 32.1 G/DL (ref 31–37)
MCV RBC AUTO: 90.5 FL (ref 80–100)
PLATELET # BLD AUTO: 247 K/UL (ref 150–450)
PMV BLD AUTO: 9.4 FL (ref 6–12)
RBC # BLD AUTO: 4.7 M/UL (ref 4–5.2)
WBC OTHER # BLD: 6 K/UL (ref 3.5–11)

## 2024-12-22 PROCEDURE — 1240000000 HC EMOTIONAL WELLNESS R&B

## 2024-12-22 PROCEDURE — 84703 CHORIONIC GONADOTROPIN ASSAY: CPT

## 2024-12-22 PROCEDURE — 99232 SBSQ HOSP IP/OBS MODERATE 35: CPT | Performed by: PSYCHIATRY & NEUROLOGY

## 2024-12-22 PROCEDURE — 85027 COMPLETE CBC AUTOMATED: CPT

## 2024-12-22 PROCEDURE — 6370000000 HC RX 637 (ALT 250 FOR IP): Performed by: PSYCHIATRY & NEUROLOGY

## 2024-12-22 PROCEDURE — 6370000000 HC RX 637 (ALT 250 FOR IP): Performed by: NURSE PRACTITIONER

## 2024-12-22 PROCEDURE — 36415 COLL VENOUS BLD VENIPUNCTURE: CPT

## 2024-12-22 RX ADMIN — RISPERIDONE 1 MG: 1 TABLET, FILM COATED ORAL at 08:52

## 2024-12-22 RX ADMIN — ANTI-FUNGAL POWDER MICONAZOLE NITRATE TALC FREE: 1.42 POWDER TOPICAL at 21:39

## 2024-12-22 RX ADMIN — Medication 3 MG: at 21:39

## 2024-12-22 RX ADMIN — ANTI-FUNGAL POWDER MICONAZOLE NITRATE TALC FREE: 1.42 POWDER TOPICAL at 08:52

## 2024-12-22 RX ADMIN — RISPERIDONE 1 MG: 1 TABLET, FILM COATED ORAL at 21:39

## 2024-12-22 RX ADMIN — HYDROXYZINE HYDROCHLORIDE 50 MG: 50 TABLET ORAL at 21:39

## 2024-12-22 ASSESSMENT — LIFESTYLE VARIABLES: HOW OFTEN DO YOU HAVE A DRINK CONTAINING ALCOHOL: NEVER

## 2024-12-22 NOTE — GROUP NOTE
Group Therapy Note    Date: 12/22/2024    Group Start Time: 1330  Group End Time: 1415  Group Topic: Cognitive Skills    [facility] D    Nicolette Islas CTRS        Group Therapy Note    Attendees:12/20       Patient's Goal:  pt will demonstrate improved coping skills and improved interpersonal relationships     Notes:  pt was on periphery of group and had minimal interactions    Status After Intervention:  unchanged    Participation Level: Active Listener     Participation Quality: minimal       Speech:  soft spoken mumbles      Thought Process/Content: Logical      Affective Functioning: flat      Mood: depressed      Level of consciousness:  Alert      Response to Learning: Able to verbalize current knowledge/experience, Capable of insight, and Progressing to goal      Endings: None Reported    Modes of Intervention: Support, Socialization, and Activity      Discipline Responsible: Psychoeducational Specialist      Signature:  MARJORIE REAVES

## 2024-12-22 NOTE — GROUP NOTE
Group Therapy Note    Date: 12/22/2024    Group Start Time: 1030  Group End Time: 1125  Group Topic: Psychoeducation    [facility] BHMarisol De Los Santos LSW        Group Therapy Note    Attendees: 12/22       Patient's Goal:  dialectical management of cognitive distortions     Notes:  therapeutic worksheet provided and discussed     Status After Intervention:  Improved    Participation Level: Active Listener and Interactive    Participation Quality: Attentive      Speech:  normal      Thought Process/Content: Logical      Affective Functioning: Blunted      Mood: euthymic      Level of consciousness:  Alert and Oriented x4      Response to Learning: Progressing to goal      Endings: None Reported    Modes of Intervention: Education and Support      Discipline Responsible: /Counselor      Signature:  AUNG Rene

## 2024-12-22 NOTE — PROGRESS NOTES
BEHAVIORAL HEALTH FOLLOW-UP NOTE     2024     Patient was seen and examined in person, Chart reviewed   Patient's case discussed with staff/team    Chief Complaint: psychosis.     Interim History:     The patient was seen face-to-face.  The patient is reporting improvement in her mental state.  She is feeling calmer after taking risperidone.  She has tolerated the risperidone long-acting injection without any side effects.  The patient has not required any as needed medication in the last 24 hours.  Her thought process appears to be clear and coherent.    /74   Pulse 63   Temp 97.3 °F (36.3 °C) (Temporal)   Resp 16   Ht 1.575 m (5' 2\")   Wt 132.9 kg (293 lb)   SpO2 100%   BMI 53.59 kg/m²   Appetite:   [x] Normal/Unchanged  [] Increased  [] Decreased      Sleep:       [] Normal/Unchanged  [x] Fair       [] Poor              Energy:    [] Normal/Unchanged  [x] Increased  [] Decreased        Aggression:  [] yes  [x] no    Patient is [x] able  [] unable to CONTRACT FOR SAFETY ON THE UNIT    PAST MEDICAL/PSYCHIATRIC HISTORY:   Past Medical History:   Diagnosis Date    Bipolar disorder ([facility])     Depression 07/15/2013    Obesity     Type 2 diabetes mellitus without complication, without long-term current use of insulin ([facility])        FAMILY/SOCIAL HISTORY:  Family History   Problem Relation Age of Onset    Cancer Maternal Grandmother         skin cancer    Breast Cancer Neg Hx     Colon Cancer Neg Hx     Diabetes Neg Hx     Eclampsia Neg Hx     Hypertension Neg Hx     Ovarian Cancer Neg Hx      Labor Neg Hx     Spont Abortions Neg Hx     Stroke Neg Hx      Social History     Socioeconomic History    Marital status: Single     Spouse name: Not on file    Number of children: Not on file    Years of education: Not on file    Highest education level: Not on file   Occupational History    Not on file   Tobacco Use    Smoking status: Some Days     Current packs/day: 0.00     Average packs/day: 0.1

## 2024-12-22 NOTE — GROUP NOTE
Group Therapy Note    Date: 12/21/2024    Group Start Time: 2000  Group End Time: 2030  Group Topic: Wrap-Up    Aretha Venegas RN      Group Therapy Note    Attendees: 15/21      Patient's Goal:    1.  To be able to reflect on daily unit activities/experiences.  2.  To review accomplished daily goals and be encouraged to set new goals for the next day.  3.  To improve interpersonal interaction through socialization.     Notes:  Pt attended and actively participated in Wrap-Up/Goal Review group this evening.     Status After Intervention:  Improved     Participation Level: Active Listener and Interactive     Participation Quality: Appropriate, Attentive and Sharing     Speech:  normal     Thought Process/Content: Logical     Affective Functioning: Congruent     Mood: euthymic     Level of consciousness:  Alert, Oriented x4 and Attentive     Response to Learning: Able to verbalize current knowledge/experience, Progressing to goal     Endings: None Reported     Modes of Intervention: Education, Support and Socialization     Discipline Responsible: Registered Nurse        Signature:  Aretha Gomez RN

## 2024-12-23 VITALS
SYSTOLIC BLOOD PRESSURE: 129 MMHG | RESPIRATION RATE: 14 BRPM | DIASTOLIC BLOOD PRESSURE: 83 MMHG | TEMPERATURE: 98.2 F | OXYGEN SATURATION: 99 % | HEIGHT: 62 IN | HEART RATE: 96 BPM | WEIGHT: 293 LBS | BODY MASS INDEX: 53.92 KG/M2

## 2024-12-23 PROCEDURE — 99239 HOSP IP/OBS DSCHRG MGMT >30: CPT | Performed by: PSYCHIATRY & NEUROLOGY

## 2024-12-23 PROCEDURE — 6370000000 HC RX 637 (ALT 250 FOR IP): Performed by: PSYCHIATRY & NEUROLOGY

## 2024-12-23 RX ADMIN — RISPERIDONE 1 MG: 1 TABLET, FILM COATED ORAL at 08:47

## 2024-12-23 RX ADMIN — ANTI-FUNGAL POWDER MICONAZOLE NITRATE TALC FREE: 1.42 POWDER TOPICAL at 08:46

## 2024-12-23 NOTE — BH NOTE
[facility] [place]  Discharge Note    Pt discharged with followings belongings:   Dental Appliances: None  Vision - Corrective Lenses: None  Hearing Aid: None  Jewelry: Body Piercing, Ring, Earrings (eyebrow ring intact pt unable to take out)  Body Piercings Removed: No (pt unable to remove eyebrow ring)  Clothing: Footwear, Undergarments, Pants, Shirt, Jacket/Coat  Other Valuables: Wallet, Credit/Debit Card, Lighter/Matches, Other (Comment) (secuirty)   Valuables returned to patient. Patient educated on aftercare instructions: yes, medication education and follow up appointment.  Information faxed to [facility] by nursing staff  at 3:03 PM .Patient verbalize understanding of AVS:  yes. Patient discharged to home, transported by insurance cab.    Status EXAM upon discharge:  Mental Status and Behavioral Exam  Normal: No  Level of Assistance: Independent/Self  Facial Expression: Flat  Affect: Appropriate  Level of Consciousness: Alert  Frequency of Checks: 4 times per hour, close  Mood:Normal: No  Mood: Depressed, Anxious, Helpless  Motor Activity:Normal: Yes  Motor Activity: Increased  Eye Contact: Fair  Observed Behavior: Cooperative, Withdrawn, Guarded, Preoccupied  Sexual Misconduct History: Current - no  Preception: [place] to person, [place] to time, [place] to place, [place] to situation  Attention:Normal: No  Attention: Unable to concentrate  Thought Processes: Circumstantial  Thought Content:Normal: No  Thought Content: Preoccupations, Poverty of content  Depression Symptoms: Feelings of helplessness, Feelings of hopelessess, Loss of interest  Anxiety Symptoms: Generalized  Mag Symptoms: No problems reported or observed.  Hallucinations: None (denies)  Delusions: No  Delusions: Paranoid, Obsessions  Memory:Normal: No  Memory: Poor recent, Poor remote  Insight and Judgment: No  Insight and Judgment: Poor judgment, Poor insight    Tobacco Screening:  Practical Counseling, on admission, luz maria X, if applicable

## 2024-12-23 NOTE — GROUP NOTE
Group Therapy Note    Date: 12/22/2024    Group Start Time: 2005  Group End Time: 2040  Group Topic: Wrap-Up    Aretha Venegas RN      Group Therapy Note    Attendees: 18/22    Patient's Goals:    1.  To be able to reflect on daily unit activities/experiences.  2.  To review accomplished daily goals and be encouraged to set new goals for the next day.  3.  To improve interpersonal interaction through socialization.        Notes:  Pt attended Wrap-up group this evening with minimal participation.     Status After Intervention:  Improved     Participation Level: Minimal    Participation Quality: Appropriate and Sharing    Speech:  normal    Thought Process/Content: Linear    Affective Functioning: Constricted/Restricted    Mood: euthymic    Level of consciousness:  Alert and Preoccupied    Response to Learning: Progressing to goal    Endings: None Reported    Modes of Intervention: Education, Support, Socialization, and Exploration    Discipline Responsible: Registered Nurse      Signature:  Aretha Gomez RN

## 2024-12-23 NOTE — PLAN OF CARE
Problem: Mag  Goal: Will exhibit normal sleep and speech and no impulsivity  Description: INTERVENTIONS:  1. Administer medication as ordered  2. Set limits on impulsive behavior  3. Make attempts to decrease external stimuli as possible  Outcome: Progressing     Problem: Psychosis  Goal: Will report no hallucinations or delusions  Description: INTERVENTIONS:  1. Administer medication as  ordered  2. Assist with reality testing to support increasing orientation  3. Assess if patient's hallucinations or delusions are encouraging self harm or harm to others and intervene as appropriate  Outcome: Progressing     Problem: Self Harm/Suicidality  Goal: Will have no self-injury during hospital stay  Description: INTERVENTIONS:  1.  Ensure constant observer at bedside with Q15M safety checks  2.  Maintain a safe environment  3.  Secure patient belongings  4.  Ensure family/visitors adhere to safety recommendations  5.  Ensure safety tray has been added to patient's diet order  6.  Every shift and PRN: Re-assess suicidal risk via Frequent Screener    Outcome: Progressing  Note: Patient denies suicidal/homicidal ideation. Patient agreeable to seek out staff should thoughts of self harm/harming others arise. Patient denies hallucinations. Patient is positive for anxiety/depression but does not rate. Patient is pleasant and cooperative, oriented to person, place and time, aloof of peers. Patient is medication compliant and behavior controlled. Comfort and reassurance provided. Safety checks maintained every 15 minutes and as needed.       
  Problem: Self Harm/Suicidality  Goal: Will have no self-injury during hospital stay  Description: INTERVENTIONS:  1.  Ensure constant observer at bedside with Q15M safety checks  2.  Maintain a safe environment  3.  Secure patient belongings  4.  Ensure family/visitors adhere to safety recommendations  5.  Ensure safety tray has been added to patient's diet order  6.  Every shift and PRN: Re-assess suicidal risk via Frequent Screener    12/20/2024 2108 by Esmer Jha RN  Outcome: Progressing  Note: Pt denies suicidal thoughts, remains safe and free from injury, will continue to reevaluate throughout shift, q 15 min safety checks maintained.     Problem: Risk for Elopement  Goal: Patient will not exit the unit/facility without proper excort  Outcome: Progressing  Note: Pt has not attempted to leave facility without proper escort, no outward signs of risk for elopement. Q 15 min safety checks maintained.     
  Problem: Self Harm/Suicidality  Goal: Will have no self-injury during hospital stay  Description: INTERVENTIONS:  1.  Ensure constant observer at bedside with Q15M safety checks  2.  Maintain a safe environment  3.  Secure patient belongings  4.  Ensure family/visitors adhere to safety recommendations  5.  Ensure safety tray has been added to patient's diet order  6.  Every shift and PRN: Re-assess suicidal risk via Frequent Screener    12/21/2024 2211 by Esmer Jha RN  Outcome: Progressing  Note: Pt denies suicidal thoughts at this time, will continue to reevaluate throughout shift, q 15 min safety checks maintained     Problem: Mag  Goal: Will exhibit normal sleep and speech and no impulsivity  Description: INTERVENTIONS:  1. Administer medication as ordered  2. Set limits on impulsive behavior  3. Make attempts to decrease external stimuli as possible  Outcome: Progressing  Note: Pt exhibits normal sleep and speech patter, with no impulsivity, will continue to monitor to signs/symptoms of mag throughout shift, q 15 min safety checks maintained.     
  Problem: Self Harm/Suicidality  Goal: Will have no self-injury during hospital stay  Description: INTERVENTIONS:  1.  Ensure constant observer at bedside with Q15M safety checks  2.  Maintain a safe environment  3.  Secure patient belongings  4.  Ensure family/visitors adhere to safety recommendations  5.  Ensure safety tray has been added to patient's diet order  6.  Every shift and PRN: Re-assess suicidal risk via Frequent Screener    12/22/2024 1014 by Malka Gibbs, LPN  Outcome: Progressing  Note: Patient denies any current suicidal thoughts and agrees to seek out staff if thoughts arise. Depressed and anxious. Preoccupied and guarded during talk time. Isolative and out for needs. Compliant with medications and staff.      
  Problem: Self Harm/Suicidality  Goal: Will have no self-injury during hospital stay  Description: INTERVENTIONS:  1.  Ensure constant observer at bedside with Q15M safety checks  2.  Maintain a safe environment  3.  Secure patient belongings  4.  Ensure family/visitors adhere to safety recommendations  5.  Ensure safety tray has been added to patient's diet order  6.  Every shift and PRN: Re-assess suicidal risk via Frequent Screener    Outcome: Progressing  Note: Patient denies any current suicidal thoughts and agrees to seek out staff if thoughts arise. Patient depressed and anxious. Patient isolative to self and is out for needs. Guarded and preoccupied during talk time. Endorses auditory hallucinations. Patient compliant with staff and medications.     Problem: Self Harm/Suicidality  Goal: Will have no self-injury during hospital stay  Description: INTERVENTIONS:  1.  Ensure constant observer at bedside with Q15M safety checks  2.  Maintain a safe environment  3.  Secure patient belongings  4.  Ensure family/visitors adhere to safety recommendations  5.  Ensure safety tray has been added to patient's diet order  6.  Every shift and PRN: Re-assess suicidal risk via Frequent Screener    Outcome: Progressing  Note: Patient denies any current suicidal thoughts and agrees to seek out staff if thoughts arise. Patient depressed and anxious. Patient isolative to self and is out for needs. Guarded and preoccupied during talk time. Endorses auditory hallucinations. Patient compliant with staff and medications.     
  Problem: Self Harm/Suicidality  Goal: Will have no self-injury during hospital stay  Description: INTERVENTIONS:  1.  Ensure constant observer at bedside with Q15M safety checks  2.  Maintain a safe environment  3.  Secure patient belongings  4.  Ensure family/visitors adhere to safety recommendations  5.  Ensure safety tray has been added to patient's diet order  6.  Every shift and PRN: Re-assess suicidal risk via Frequent Screener    Outcome: Progressing  Note: Patient denies thoughts of self harm at this time. Patient encouraged to seek out to staff if thoughts arise. Safety checks maintained Q 15 minutes.     Problem: Mag  Goal: Will exhibit normal sleep and speech and no impulsivity  Description: INTERVENTIONS:  1. Administer medication as ordered  2. Set limits on impulsive behavior  3. Make attempts to decrease external stimuli as possible  Outcome: Progressing     Problem: Psychosis  Goal: Will report no hallucinations or delusions  Description: INTERVENTIONS:  1. Administer medication as  ordered  2. Assist with reality testing to support increasing orientation  3. Assess if patient's hallucinations or delusions are encouraging self harm or harm to others and intervene as appropriate  Outcome: Progressing     Problem: Risk for Elopement  Goal: Patient will not exit the unit/facility without proper excort  Outcome: Progressing  Flowsheets  Taken 12/19/2024 2207 by Kaylin Cerrato LPN  Nursing Interventions for Elopement Risk:   Communicate/escalate to charge nurse the risk of elopement   Escort with two staff members if patient must leave the unit   Place patient in room far away from exits and stairways   Reduce environmental triggers  Taken 12/19/2024 1747 by Josué Jean, RN  Nursing Interventions for Elopement Risk:   Communicate/escalate to charge nurse the risk of elopement   Reduce environmental triggers   Place patient in room far away from exits and stairways   Escort with two staff 
[facility]  Day 3 Interdisciplinary Treatment Plan NOTE    Review Date & Time: 12/22/2024 1245    Admission Type:   Admission Type: Voluntary    Reason for admission:  Reason for Admission: Patient came to visit parent on another unit and was found to be wondering around looking for her parent. Was brought down to ER for evaluation started to become anxious, paranoid, and responding to internal stimuli. While in ER was unable to sit still extremely restless and pacing around was able to be redirected but would make bizzare comments and when asked to clarify was unable with extreme thought block.  Estimated Length of Stay:  5-7 days  Estimated Discharge Date Update:   to be determined by physician    PATIENT STRENGTHS:  Patient Strengths:   Patient Strengths and Limitations:Limitations: Difficulty problem solving/relies on others to help solve problems, External locus of control, Unrealistic self-view  Addictive Behavior:Addictive Behavior  In the Past 3 Months, Have You Felt or Has Someone Told You That You Have a Problem With  : None  Medical Problems:  Past Medical History:   Diagnosis Date    Bipolar disorder ([facility])     Depression 07/15/2013    Obesity     Type 2 diabetes mellitus without complication, without long-term current use of insulin ([facility])        Risk:  Fall Risk   Rene Scale Rene Scale Score: 22  BVC    Change in scores:  No Changes to plan of Care:  No    Status EXAM:   Mental Status and Behavioral Exam  Normal: No  Level of Assistance: Independent/Self  Facial Expression: Flat  Affect: Appropriate  Level of Consciousness: Alert  Frequency of Checks: 4 times per hour, close  Mood:Normal: No  Mood: Depressed, Anxious, Helpless  Motor Activity:Normal: No  Motor Activity: Increased  Eye Contact: Fair  Observed Behavior: Guarded, Preoccupied, Cooperative, Friendly, Withdrawn  Sexual Misconduct History: Current - no  Preception: [place] to person, [place] to place, [place] to time, [place] 
[facility]  Initial Interdisciplinary Treatment Plan NO      Original treatment plan Date & Time: 12/20/2024 1245     Admission Type:  Admission Type: Voluntary    Reason for admission:   Reason for Admission: Patient came to visit parent on another unit and was found to be wondering around looking for her parent. Was brought down to ER for evaluation started to become anxious, paranoid, and responding to internal stimuli. While in ER was unable to sit still extremely restless and pacing around was able to be redirected but would make bizzare comments and when asked to clarify was unable with extreme thought block.    Estimated Length of Stay:  5-7days  Estimated Discharge Date: to be determined by physician    PATIENT STRENGTHS:  Patient Strengths:   Patient Strengths and Limitations:Limitations: Difficulty problem solving/relies on others to help solve problems, External locus of control, Unrealistic self-view  Addictive Behavior: Addictive Behavior  In the Past 3 Months, Have You Felt or Has Someone Told You That You Have a Problem With  : None  Medical Problems:  Past Medical History:   Diagnosis Date    Bipolar disorder ([facility])     Depression 07/15/2013    Obesity     Type 2 diabetes mellitus without complication, without long-term current use of insulin ([facility])      Status EXAM:Mental Status and Behavioral Exam  Normal: No  Level of Assistance: Independent/Self  Facial Expression: Flat, Worried  Affect: Blunt, Unstable  Level of Consciousness: Alert  Frequency of Checks: 4 times per hour, close  Mood:Normal: No  Mood: Depressed, Anxious, Helpless  Motor Activity:Normal: No  Motor Activity: Increased  Eye Contact: Fair  Observed Behavior: Withdrawn, Impulsive, Cooperative, Preoccupied  Sexual Misconduct History: Current - no  Preception: [place] to person  Attention:Normal: No  Attention: Unable to concentrate  Thought Processes: Blocking, Circumstantial  Thought Content:Normal: No  Thought Content: 
hallucinations or delusions are encouraging self harm or harm to others and intervene as appropriate  Outcome: Progressing  Note: Patient denies any hallucinations, no delusions reported or observed this shift. Patient out in day room but aloof of peers. Patient attended group this evening with minimal participation.       Problem: Risk for Elopement  Goal: Patient will not exit the unit/facility without proper excort  Flowsheets (Taken 12/23/2024 9038)  Nursing Interventions for Elopement Risk:   Make sure patient has all necessary personal care items   Place patient in room far away from exits and stairways   Reduce environmental triggers  Outcome: Progressing  Note: No elopement behaviors noted this shift.

## 2024-12-23 NOTE — GROUP NOTE
Group Therapy Note    Date: 12/23/2024    Group Start Time: 0900  Group End Time: 0930  Group Topic: Nursing    [facility]    Iman Aceves LPN        Group Therapy Note    Attendees: 8/22       Patient's Goal:  Discharge     Status After Intervention:  Improved    Participation Level: Active Listener and Interactive    Participation Quality: Appropriate and Attentive      Speech:  normal      Thought Process/Content: Logical  Linear      Affective Functioning: Congruent      Mood: euthymic      Level of consciousness:  Alert, Oriented x4, and Attentive      Response to Learning: Able to verbalize current knowledge/experience, Able to verbalize/acknowledge new learning, Able to retain information, Capable of insight, Able to change behavior, and Progressing to goal      Endings: None Reported    Modes of Intervention: Education, Support, and Socialization      Discipline Responsible: Licensed Practical Nurse      Signature:  Iman Aceves LPN

## 2024-12-23 NOTE — BH NOTE
Patient given tobacco quitline number 26997066224 at this time, refusing to call at this time, states \" I just dont want to quit now\"- patient given information as to the dangers of long term tobacco use. Continue to reinforce the importance of tobacco cessation.

## 2024-12-23 NOTE — GROUP NOTE
Group Therapy Note    Date: 12/23/2024    Group Start Time: 1000  Group End Time: 1030  Group Topic: Psychoeducation    Mannie Jones        Group Therapy Note    Attendees: 11/22       Patient's Goal:  PT will demonstrate increased interpersonal interaction and participate in group activities of discussing gratitude.     Notes:  :  Patient is making progress, AEB participating in group discussion, actively listening, and supporting other group members.     Status After Intervention:  Unchanged    Participation Level: Active Listener and Interactive    Participation Quality: Appropriate, Attentive, and Sharing      Speech:  normal      Thought Process/Content: Logical      Affective Functioning: Flat      Mood: depressed      Level of consciousness:  Alert, Oriented x4, and Attentive      Response to Learning: Able to verbalize/acknowledge new learning and Progressing to goal      Endings: None Reported    Modes of Intervention: Education, Support, and Socialization      Discipline Responsible: /Counselor      Signature:  Mannie Fernandez

## 2024-12-23 NOTE — CARE COORDINATION
Name: Sabrina Candelaria    : 1990    Auth number: [id number]     Discharge Date: 2024    Destination: home    *If you have any specific discharge questions, please contact the assigned /discharge planner: Mannie [phone]      Discharge Medications:      Medication List        START taking these medications      melatonin 3 MG Tabs tablet  Take 1 tablet by mouth nightly  Notes to patient: For sleep     miconazole 2 % powder  Commonly known as: MICOTIN  Apply topically 2 times daily.  Notes to patient: Antifungal powder     risperiDONE  MG/0.56ML La subcutaneous injection  Commonly known as: UZEDY  Inject 0.56 mLs into the skin every 2 months  Start taking on: 2025  Notes to patient: For mood/clears thoughts               Where to Get Your Medications        These medications were sent to [facility] - [address] [phone] - F [phone] [address] [facility] [address]      Phone: [phone]   melatonin 3 MG Tabs tablet  miconazole 2 % powder  risperiDONE  MG/0.56ML Al subcutaneous injection         Follow Up Appointment: [facility] [address] [place] [address]  [phone]  fax [phone]  Follow up on 2024  Please attend a walk-in appointment from Monday-Friday from 8:00am-4:00pm to re-establish services at [facility], since you have not seen your provider since 2024.

## 2024-12-23 NOTE — DISCHARGE SUMMARY
DISCHARGE SUMMARY      Patient ID:  Sabrina Candelaria  [id number]  34 y.o.  1990    Admit date: 12/19/2024    Discharge date and time: 12/23/2024    Disposition: Home     Admitting Physician: Yunior Heredia MD     Discharge Physician: Dr CELESTINO Heredia MD    Admission Diagnoses: Acute psychosis ([facility]) [F23]  Manic behavior (HCC) [F30.10]    Admission Condition: poor    Discharged Condition: stable    Admission Circumstance: Sabrina Candelaria is a 34 y.o. female who has a past medical history of depression, bipolar disorder, multiple STDs. Patient presented to the ED for a mental health evaluation stating she has been feeling \"worried and anxious.\" During the course of assessment patient is actively paranoid and responding to internal stimuli.   Patient appears preoccupied and responding to internal stimuli. Patient appears to be responding to internal stimuli. Patient having difficulty concentrating on writers questions and has thought blocking.     Upon presentation Alyx was found resting in her room.  She appears more oriented today.  Currently alert and oriented to person, place, and time.  Appears to be disoriented to situation.  States that she was more paranoid yesterday because she was worried about her mother.  Admits to being anxious.  Hyper fixated on checking on mother.  Is minimizing of all of her symptoms.  Yesterday she presented with significant delusions, paranoia and responding to internal stimuli.  It is documented that she was exhibiting significant self-talk.  Does not appear to be doing this currently.  At that time she was disoriented to date and believed it was 2008 and prior 1998.  Patient is currently denying any psychiatric history but it is documented that she has a history of depression and bipolar 1 disorder.  She has been exhibiting bizarre behaviors.  Was under the belief that I had been violent things were happening outside of the doors of the ER.  Had no insight.  Unable to explain any

## 2024-12-23 NOTE — DISCHARGE INSTRUCTIONS
Information:  Medications:   Medication summary provided   I understand that I should take only the medications on my list.     -why and when I need to take each medicine.     -which side effects to watch for.     -that I should carry my medication information at all times in case of     Emergency situations.    I will take all of my medicines to follow up appointments.     -check with my physician or pharmacist before taking any new    Medication, over the counter product or drink alcohol.    -Ask about food, drug or dietary supplement interactions.    -discard old lists and update records with medication providers.    Notify Physician:  Notify physician if you notice:   Always call 911 if you feel your life is in danger  In case of an emergency call 911 immediately!  If 911 is not available call your local emergency medical system for help    [facility] Follow Up:  Original Referral Source: [facility]  Discharge Diagnosis: Acute psychosis (HCC) [F23]  Manic behavior (HCC) [F30.10]  Recommendations for Level of Care: continue meds as prescribed, follow up appointment at [facility]  Patient status at discharge: alert, oriented, denies thoughts of harm to self or other, stabilized on meds  My hospital  was: [facility]  Aftercare plan faxed: [facility]   -faxed by: nursing staff   -date: 12/23/24   -time: 1500  Prescriptions: sent to [facility]    Smoking: Quit Smoking.   Call the [facility]'s smoking quitline at 8-540-54U-QUIT  Know the signs of a heart attack   If you have any of the following symptoms call 911 immediately, do not wait more    Than five minutes.    1. Pressure, fullness and/ or squeezing in the center of the chest spreading to    The jaw, neck or shoulder.    2. Chest discomfort with light headedness, fainting, sweating, nausea or    Shortness of breath.   3. Upper abdominal pressure or discomfort.   4. Lower chest pain, back pain, unusual fatigue, shortness of breath,

## 2024-12-23 NOTE — TRANSITION OF CARE
Days  Legal Status: Involuntary       Admission Diagnosis: Acute psychosis ([facility]) [F23]  Manic behavior ([facility]) [F30.10]    * No surgery found *    Results for orders placed or performed during the hospital encounter of 12/19/24   Comprehensive Metabolic Panel   Result Value Ref Range    Sodium 143 136 - 145 mmol/L    Potassium 3.3 (L) 3.7 - 5.3 mmol/L    Chloride 105 98 - 107 mmol/L    CO2 19 (L) 20 - 31 mmol/L    Anion Gap 19 (H) 9 - 16 mmol/L    Glucose 147 (H) 74 - 99 mg/dL    BUN 7 6 - 20 mg/dL    Creatinine 0.8 0.7 - 1.2 mg/dL    Est, Glom Filt Rate >90 >60 mL/min/1.73m2    Calcium 9.9 8.6 - 10.4 mg/dL    Total Protein 8.5 6.6 - 8.7 g/dL    Albumin 4.8 3.5 - 5.2 g/dL    Total Bilirubin 0.7 0.0 - 1.2 mg/dL    Alkaline Phosphatase 79 35 - 104 U/L    ALT 32 10 - 35 U/L    AST 44 (H) 10 - 35 U/L   Ethanol   Result Value Ref Range    Ethanol Lvl <10 <10 mg/dL    Ethanol percent 0.001 <0.010 %   Urine Drug Screen   Result Value Ref Range    Amphetamine Screen, Ur NEGATIVE NEGATIVE    Barbiturate Screen, Ur NEGATIVE NEGATIVE    Benzodiazepine Screen, Urine POSITIVE (A) NEGATIVE    Cocaine Metabolite, Urine NEGATIVE NEGATIVE    Methadone Screen, Urine NEGATIVE NEGATIVE    Opiates, Urine NEGATIVE NEGATIVE    Phencyclidine, Urine NEGATIVE NEGATIVE    Cannabinoid Scrn, Ur POSITIVE (A) NEGATIVE    Oxycodone Screen, Ur NEGATIVE NEGATIVE    Fentanyl, Ur NEGATIVE NEGATIVE    Test Information       This method is a screening test to detect only these drug classes as part of a medical workup. Confirmatory testing by another method should be ordered if clinically indicated.   Pregnancy, Urine   Result Value Ref Range    Pregnancy, Urine NEGATIVE NEGATIVE   Urinalysis with Reflex to Culture    Specimen: Urine   Result Value Ref Range    Color, UA Yellow Yellow    Turbidity UA Cloudy (A) Clear    Glucose, Ur NEGATIVE NEGATIVE mg/dL    Bilirubin, Urine NEGATIVE NEGATIVE    Ketones, Urine LARGE (A) NEGATIVE mg/dL    Specific Gravity,

## 2025-02-26 ENCOUNTER — HOSPITAL ENCOUNTER (OUTPATIENT)
Age: 35
Setting detail: SPECIMEN
Discharge: HOME | End: 2025-02-26

## 2025-02-26 ENCOUNTER — OFFICE VISIT (OUTPATIENT)
Dept: OBGYN CLINIC | Age: 35
End: 2025-02-26
Payer: COMMERCIAL

## 2025-02-26 VITALS
HEIGHT: 62 IN | SYSTOLIC BLOOD PRESSURE: 134 MMHG | DIASTOLIC BLOOD PRESSURE: 80 MMHG | BODY MASS INDEX: 49.5 KG/M2 | WEIGHT: 269 LBS | HEART RATE: 96 BPM

## 2025-02-26 DIAGNOSIS — Z01.419 WELL WOMAN EXAM: Primary | ICD-10-CM

## 2025-02-26 DIAGNOSIS — F17.200 SMOKER: ICD-10-CM

## 2025-02-26 DIAGNOSIS — Z11.51 ENCOUNTER FOR SCREENING FOR HUMAN PAPILLOMAVIRUS (HPV): ICD-10-CM

## 2025-02-26 DIAGNOSIS — N89.8 VAGINAL DISCHARGE: ICD-10-CM

## 2025-02-26 DIAGNOSIS — N89.8 VAGINAL ODOR: ICD-10-CM

## 2025-02-26 PROCEDURE — G8417 CALC BMI ABV UP PARAM F/U: HCPCS | Performed by: CLINICAL NURSE SPECIALIST

## 2025-02-26 PROCEDURE — G8427 DOCREV CUR MEDS BY ELIG CLIN: HCPCS | Performed by: CLINICAL NURSE SPECIALIST

## 2025-02-26 PROCEDURE — 99203 OFFICE O/P NEW LOW 30 MIN: CPT | Performed by: CLINICAL NURSE SPECIALIST

## 2025-02-26 PROCEDURE — 99385 PREV VISIT NEW AGE 18-39: CPT | Performed by: CLINICAL NURSE SPECIALIST

## 2025-02-26 PROCEDURE — 4004F PT TOBACCO SCREEN RCVD TLK: CPT | Performed by: CLINICAL NURSE SPECIALIST

## 2025-02-26 ASSESSMENT — PATIENT HEALTH QUESTIONNAIRE - PHQ9
10. IF YOU CHECKED OFF ANY PROBLEMS, HOW DIFFICULT HAVE THESE PROBLEMS MADE IT FOR YOU TO DO YOUR WORK, TAKE CARE OF THINGS AT HOME, OR GET ALONG WITH OTHER PEOPLE: NOT DIFFICULT AT ALL
SUM OF ALL RESPONSES TO PHQ9 QUESTIONS 1 & 2: 0
SUM OF ALL RESPONSES TO PHQ QUESTIONS 1-9: 2
SUM OF ALL RESPONSES TO PHQ QUESTIONS 1-9: 2
1. LITTLE INTEREST OR PLEASURE IN DOING THINGS: NOT AT ALL
SUM OF ALL RESPONSES TO PHQ QUESTIONS 1-9: 2
8. MOVING OR SPEAKING SO SLOWLY THAT OTHER PEOPLE COULD HAVE NOTICED. OR THE OPPOSITE, BEING SO FIGETY OR RESTLESS THAT YOU HAVE BEEN MOVING AROUND A LOT MORE THAN USUAL: NOT AT ALL
SUM OF ALL RESPONSES TO PHQ QUESTIONS 1-9: 2
2. FEELING DOWN, DEPRESSED OR HOPELESS: NOT AT ALL
6. FEELING BAD ABOUT YOURSELF - OR THAT YOU ARE A FAILURE OR HAVE LET YOURSELF OR YOUR FAMILY DOWN: NOT AT ALL
7. TROUBLE CONCENTRATING ON THINGS, SUCH AS READING THE NEWSPAPER OR WATCHING TELEVISION: NOT AT ALL
3. TROUBLE FALLING OR STAYING ASLEEP: SEVERAL DAYS
9. THOUGHTS THAT YOU WOULD BE BETTER OFF DEAD, OR OF HURTING YOURSELF: NOT AT ALL
4. FEELING TIRED OR HAVING LITTLE ENERGY: SEVERAL DAYS
5. POOR APPETITE OR OVEREATING: NOT AT ALL

## 2025-02-26 NOTE — PROGRESS NOTES
[facility], [facility] OBSTETRICS & GYNECOLOGY  [address] [facility] [address] [place] [id number]  Dept: [phone]        DATE OF VISIT:  25        History and Physical    Sabrian Candelaria    :  1990  CHIEF COMPLAINT:    Chief Complaint   Patient presents with    New Patient                    Sabrina Candelaria is a 34 y.o. female new patient presents for annual well woman exam.      The patient was seen and examined.  Per the patient bowels are regular. She has no voiding complaints.  She denies any bloating as well as vaginal discharge.    Chaperone for Intimate Exam  Chaperone was offered as part of the rooming process. Patient declined and agrees to continue with exam without a chaperone.  Chaperone: none     _____________________________________________________________________  Past Medical History:   Diagnosis Date    Bipolar disorder (HCC)     Depression 07/15/2013    Obesity     Type 2 diabetes mellitus without complication, without long-term current use of insulin (HCC)                                                                    Past Surgical History:   Procedure Laterality Date     SECTION      2011    TONSILLECTOMY      at age 12     Family History   Problem Relation Age of Onset    Cancer Maternal Grandmother         skin cancer    Breast Cancer Neg Hx     Colon Cancer Neg Hx     Diabetes Neg Hx     Eclampsia Neg Hx     Hypertension Neg Hx     Ovarian Cancer Neg Hx      Labor Neg Hx     Spont Abortions Neg Hx     Stroke Neg Hx      Social History     Tobacco Use   Smoking Status Some Days    Current packs/day: 0.00    Average packs/day: 0.1 packs/day for 1 year (0.1 ttl pk-yrs)    Types: Cigarettes    Start date: 2011    Last attempt to quit: 2012    Years since quittin.1   Smokeless Tobacco Never   Tobacco Comments    Black and milds      Social History     Substance and Sexual Activity   Alcohol Use Yes

## 2025-02-27 DIAGNOSIS — N89.8 VAGINAL ODOR: ICD-10-CM

## 2025-02-27 DIAGNOSIS — N89.8 VAGINAL DISCHARGE: ICD-10-CM

## 2025-02-27 DIAGNOSIS — N76.0 BV (BACTERIAL VAGINOSIS): Primary | ICD-10-CM

## 2025-02-27 DIAGNOSIS — B96.89 BV (BACTERIAL VAGINOSIS): Primary | ICD-10-CM

## 2025-02-27 LAB
CANDIDA SPECIES: NEGATIVE
GARDNERELLA VAGINALIS: POSITIVE
SOURCE: ABNORMAL
TRICHOMONAS: NEGATIVE

## 2025-02-27 RX ORDER — METRONIDAZOLE 500 MG/1
500 TABLET ORAL 2 TIMES DAILY
Qty: 14 TABLET | Refills: 0 | Status: SHIPPED | OUTPATIENT
Start: 2025-02-27 | End: 2025-03-06

## 2025-02-28 LAB
HPV I/H RISK 4 DNA CVX QL NAA+PROBE: NOT DETECTED
HPV SAMPLE: NORMAL
HPV, INTERPRETATION: NORMAL
HPV16 DNA CVX QL NAA+PROBE: NOT DETECTED
HPV18 DNA CVX QL NAA+PROBE: NOT DETECTED
SPECIMEN DESCRIPTION: NORMAL

## 2025-03-07 LAB — CYTOLOGY REPORT: NORMAL
